# Patient Record
Sex: FEMALE | Race: WHITE | NOT HISPANIC OR LATINO | URBAN - METROPOLITAN AREA
[De-identification: names, ages, dates, MRNs, and addresses within clinical notes are randomized per-mention and may not be internally consistent; named-entity substitution may affect disease eponyms.]

---

## 2019-04-12 ENCOUNTER — OFFICE VISIT (OUTPATIENT)
Dept: POSTPARTUM | Facility: CLINIC | Age: 24
End: 2019-04-12
Payer: COMMERCIAL

## 2019-04-12 DIAGNOSIS — Z71.89 ENCOUNTER FOR BREAST FEEDING COUNSELING: Primary | ICD-10-CM

## 2019-04-12 PROCEDURE — 99404 PREV MED CNSL INDIV APPRX 60: CPT | Performed by: PEDIATRICS

## 2019-04-12 RX ORDER — PNV NO.95/FERROUS FUM/FOLIC AC 28MG-0.8MG
1 TABLET ORAL DAILY
COMMUNITY

## 2022-03-03 ENCOUNTER — OFFICE VISIT (OUTPATIENT)
Dept: FAMILY MEDICINE CLINIC | Facility: CLINIC | Age: 27
End: 2022-03-03
Payer: COMMERCIAL

## 2022-03-03 VITALS
DIASTOLIC BLOOD PRESSURE: 78 MMHG | RESPIRATION RATE: 18 BRPM | BODY MASS INDEX: 22.98 KG/M2 | TEMPERATURE: 98 F | SYSTOLIC BLOOD PRESSURE: 118 MMHG | WEIGHT: 134.6 LBS | OXYGEN SATURATION: 98 % | HEIGHT: 64 IN | HEART RATE: 99 BPM

## 2022-03-03 DIAGNOSIS — E55.9 VITAMIN D DEFICIENCY: ICD-10-CM

## 2022-03-03 DIAGNOSIS — R53.83 FATIGUE, UNSPECIFIED TYPE: ICD-10-CM

## 2022-03-03 DIAGNOSIS — Z00.00 ANNUAL PHYSICAL EXAM: Primary | ICD-10-CM

## 2022-03-03 DIAGNOSIS — Z13.6 SCREENING FOR CARDIOVASCULAR CONDITION: ICD-10-CM

## 2022-03-03 DIAGNOSIS — R45.86 MOOD SWING: ICD-10-CM

## 2022-03-03 DIAGNOSIS — Z11.59 NEED FOR HEPATITIS C SCREENING TEST: ICD-10-CM

## 2022-03-03 DIAGNOSIS — Z11.4 SCREENING FOR HIV (HUMAN IMMUNODEFICIENCY VIRUS): ICD-10-CM

## 2022-03-03 DIAGNOSIS — Z13.1 SCREENING FOR DIABETES MELLITUS: ICD-10-CM

## 2022-03-03 DIAGNOSIS — Z23 ENCOUNTER FOR IMMUNIZATION: ICD-10-CM

## 2022-03-03 DIAGNOSIS — Z12.4 SCREENING FOR CERVICAL CANCER: ICD-10-CM

## 2022-03-03 PROCEDURE — 90715 TDAP VACCINE 7 YRS/> IM: CPT | Performed by: FAMILY MEDICINE

## 2022-03-03 PROCEDURE — 99385 PREV VISIT NEW AGE 18-39: CPT | Performed by: FAMILY MEDICINE

## 2022-03-03 PROCEDURE — 1036F TOBACCO NON-USER: CPT | Performed by: FAMILY MEDICINE

## 2022-03-03 PROCEDURE — 3725F SCREEN DEPRESSION PERFORMED: CPT | Performed by: FAMILY MEDICINE

## 2022-03-03 PROCEDURE — 90471 IMMUNIZATION ADMIN: CPT | Performed by: FAMILY MEDICINE

## 2022-03-03 PROCEDURE — 3008F BODY MASS INDEX DOCD: CPT | Performed by: FAMILY MEDICINE

## 2022-03-03 NOTE — PROGRESS NOTES
[facility]    NAME: Israel Genao  AGE: 27 y o  SEX: female  : 1995     DATE: 3/3/2022     Assessment and Plan:     Problem List Items Addressed This Visit        Other    Vitamin D deficiency    Relevant Orders    Vitamin D 25 hydroxy    Mood swing    Relevant Orders    Lipid panel    Comprehensive metabolic panel    CBC and differential    TSH, 3rd generation with Free T4 reflex    UA (URINE) with reflex to Scope    Vitamin D 25 hydroxy    Fatigue    Relevant Orders    Lipid panel    Comprehensive metabolic panel    CBC and differential    TSH, 3rd generation with Free T4 reflex    UA (URINE) with reflex to Scope    Vitamin D 25 hydroxy      Other Visit Diagnoses     Annual physical exam    -  Primary    Relevant Orders    Lipid panel    Comprehensive metabolic panel    CBC and differential    TSH, 3rd generation with Free T4 reflex    UA (URINE) with reflex to Scope    Human Immunodeficiency Virus 1/2 Antigen / Antibody ( Fourth Generation) with Reflex Testing    Hepatitis C antibody    Vitamin D 25 hydroxy    Screening for cervical cancer        Relevant Orders    Ambulatory referral to Obstetrics / Gynecology    Screening for diabetes mellitus        Relevant Orders    Lipid panel    Comprehensive metabolic panel    Screening for cardiovascular condition        Relevant Orders    Lipid panel    Comprehensive metabolic panel    Screening for HIV (human immunodeficiency virus)        Relevant Orders    Human Immunodeficiency Virus 1/2 Antigen / Antibody ( Fourth Generation) with Reflex Testing    Need for hepatitis C screening test        Relevant Orders    Hepatitis C antibody    Encounter for immunization        Relevant Orders    TDAP VACCINE GREATER THAN OR EQUAL TO 6YO IM (Completed)          Immunizations and preventive care screenings were discussed with patient today   Appropriate education was printed on patient's after visit summary  Counseling:  Alcohol/drug use: discussed moderation in alcohol intake, the recommendations for healthy alcohol use, and avoidance of illicit drug use  Dental Health: discussed importance of regular tooth brushing, flossing, and dental visits  Injury prevention: discussed safety/seat belts, safety helmets, smoke detectors, carbon dioxide detectors, and smoking near bedding or upholstery  Sexual health: discussed sexually transmitted diseases, partner selection, use of condoms, avoidance of unintended pregnancy, and contraceptive alternatives  · Exercise: the importance of regular exercise/physical activity was discussed  Recommend exercise 3-5 times per week for at least 30 minutes  Return in about 1 year (around 3/3/2023) for Annual physical      Chief Complaint:     Chief Complaint   Patient presents with    Annual Exam      History of Present Illness:     Adult Annual Physical   Patient here for a comprehensive physical exam  Reports having more mood swings since having her kids  Especially worse since having kids  Diet and Physical Activity  · Diet/Nutrition: well balanced diet  · Exercise: moderate cardiovascular exercise  Depression Screening  PHQ-2/9 Depression Screening    Little interest or pleasure in doing things: 0 - not at all  Feeling down, depressed, or hopeless: 0 - not at all  PHQ-2 Score: 0  PHQ-2 Interpretation: Negative depression screen          /GYN Health  · Son 2019  · Son 2020  · 2      Review of Systems:     Review of Systems   Constitutional: Positive for fatigue  Negative for chills and fever  HENT: Negative for congestion, postnasal drip, rhinorrhea and sinus pressure  Eyes: Negative for photophobia and visual disturbance  Respiratory: Negative for cough and shortness of breath  Cardiovascular: Negative for chest pain, palpitations and leg swelling     Gastrointestinal: Negative for abdominal pain, constipation, diarrhea, nausea and vomiting  Genitourinary: Negative for difficulty urinating and dysuria  Musculoskeletal: Negative for arthralgias and myalgias  Skin: Negative for color change and rash  Neurological: Negative for dizziness, weakness, light-headedness and headaches  Psychiatric/Behavioral: Positive for behavioral problems  The patient is nervous/anxious  Past Medical History:     History reviewed  No pertinent past medical history     Past Surgical History:     Past Surgical History:   Procedure Laterality Date    WISDOM TOOTH EXTRACTION        Social History:     Social History     Socioeconomic History    Marital status: Unknown     Spouse name: None    Number of children: None    Years of education: None    Highest education level: None   Occupational History    None   Tobacco Use    Smoking status: Never Smoker    Smokeless tobacco: Never Used   Vaping Use    Vaping Use: Never used   Substance and Sexual Activity    Alcohol use: Not Currently    Drug use: Never    Sexual activity: Not Currently   Other Topics Concern    None   Social History Narrative    None     Social Determinants of Health     Financial Resource Strain: Not on file   Food Insecurity: Not on file   Transportation Needs: Not on file   Physical Activity: Not on file   Stress: Not on file   Social Connections: Not on file   Intimate Partner Violence: Not At Risk    Fear of Current or Ex-Partner: No    Emotionally Abused: No    Physically Abused: No    Sexually Abused: No   Housing Stability: Not on file      Family History:     Family History   Problem Relation Age of Onset    No Known Problems Mother     No Known Problems Father       Current Medications:     Current Outpatient Medications   Medication Sig Dispense Refill    Ferrous Sulfate (IRON) 325 (65 Fe) MG TABS Take 1 tablet by mouth daily (Patient not taking: Reported on 3/3/2022 )      Prenatal Vit-Fe Fumarate-FA (PRENATAL VITAMIN PO) Take 1 capsule by mouth daily (Patient not taking: Reported on 3/3/2022 )       No current facility-administered medications for this visit  Allergies:     No Known Allergies   Physical Exam:     /78 (BP Location: Left arm, Patient Position: Sitting, Cuff Size: Standard)   Pulse 99   Temp 98 °F (36 7 °C) (Tympanic)   Resp 18   Ht 5' 4 3" (1 633 m)   Wt 61 1 kg (134 lb 9 6 oz)   LMP 02/18/2022   SpO2 98%   BMI 22 89 kg/m²     Physical Exam  Constitutional:       General: She is not in acute distress  Appearance: Normal appearance  She is not ill-appearing, toxic-appearing or diaphoretic  HENT:      Head: Normocephalic and atraumatic  Right Ear: Tympanic membrane and ear canal normal       Left Ear: Tympanic membrane and ear canal normal       Nose: Nose normal  No congestion  Mouth/Throat:      Mouth: Mucous membranes are moist       Pharynx: Oropharynx is clear  No oropharyngeal exudate  Eyes:      Extraocular Movements: Extraocular movements intact  Conjunctiva/sclera: Conjunctivae normal    Cardiovascular:      Rate and Rhythm: Normal rate and regular rhythm  Pulses: Normal pulses  Heart sounds: No murmur heard  Pulmonary:      Effort: Pulmonary effort is normal       Breath sounds: Normal breath sounds  No wheezing, rhonchi or rales  Abdominal:      General: Bowel sounds are normal  There is no distension  Palpations: Abdomen is soft  Tenderness: There is no abdominal tenderness  Musculoskeletal:         General: No swelling or tenderness  Normal range of motion  Cervical back: Normal range of motion and neck supple  Skin:     General: Skin is warm and dry  Capillary Refill: Capillary refill takes less than 2 seconds  Neurological:      General: No focal deficit present  Mental Status: She is alert and oriented to person, place, and time  Cranial Nerves: No cranial nerve deficit     Psychiatric:         Mood and Affect: Mood normal          Behavior: Behavior normal          Thought Content:  Thought content normal           Meseret Wilkins DO   [facility]

## 2022-03-03 NOTE — PATIENT INSTRUCTIONS

## 2022-07-28 LAB
25(OH)D3 SERPL-MCNC: 56 NG/ML (ref 30–100)
ALBUMIN SERPL-MCNC: 4.4 G/DL (ref 3.6–5.1)
ALBUMIN/GLOB SERPL: 1.9 (CALC) (ref 1–2.5)
ALP SERPL-CCNC: 49 U/L (ref 31–125)
ALT SERPL-CCNC: 11 U/L (ref 6–29)
APPEARANCE UR: CLEAR
AST SERPL-CCNC: 13 U/L (ref 10–30)
BACTERIA UR QL AUTO: ABNORMAL /HPF
BASOPHILS # BLD AUTO: 22 CELLS/UL (ref 0–200)
BASOPHILS NFR BLD AUTO: 0.3 %
BILIRUB SERPL-MCNC: 0.6 MG/DL (ref 0.2–1.2)
BILIRUB UR QL STRIP: NEGATIVE
BUN SERPL-MCNC: 11 MG/DL (ref 7–25)
BUN/CREAT SERPL: NORMAL (CALC) (ref 6–22)
CALCIUM SERPL-MCNC: 9.1 MG/DL (ref 8.6–10.2)
CHLORIDE SERPL-SCNC: 101 MMOL/L (ref 98–110)
CHOLEST SERPL-MCNC: 125 MG/DL
CHOLEST/HDLC SERPL: 2.2 (CALC)
CO2 SERPL-SCNC: 26 MMOL/L (ref 20–32)
COLOR UR: YELLOW
CREAT SERPL-MCNC: 0.61 MG/DL (ref 0.5–0.96)
EOSINOPHIL # BLD AUTO: 94 CELLS/UL (ref 15–500)
EOSINOPHIL NFR BLD AUTO: 1.3 %
ERYTHROCYTE [DISTWIDTH] IN BLOOD BY AUTOMATED COUNT: 12.7 % (ref 11–15)
GFR/BSA.PRED SERPLBLD CYS-BASED-ARV: 124 ML/MIN/1.73M2
GLOBULIN SER CALC-MCNC: 2.3 G/DL (CALC) (ref 1.9–3.7)
GLUCOSE SERPL-MCNC: 87 MG/DL (ref 65–99)
GLUCOSE UR QL STRIP: NEGATIVE
HCT VFR BLD AUTO: 42.5 % (ref 35–45)
HCV AB S/CO SERPL IA: 0.18
HCV AB SERPL QL IA: NORMAL
HDLC SERPL-MCNC: 58 MG/DL
HGB BLD-MCNC: 13.7 G/DL (ref 11.7–15.5)
HGB UR QL STRIP: NEGATIVE
HIV 1+2 AB+HIV1 P24 AG SERPL QL IA: NORMAL
HYALINE CASTS #/AREA URNS LPF: ABNORMAL /LPF
KETONES UR QL STRIP: NEGATIVE
LDLC SERPL CALC-MCNC: 52 MG/DL (CALC)
LEUKOCYTE ESTERASE UR QL STRIP: ABNORMAL
LYMPHOCYTES # BLD AUTO: 2477 CELLS/UL (ref 850–3900)
LYMPHOCYTES NFR BLD AUTO: 34.4 %
MCH RBC QN AUTO: 30 PG (ref 27–33)
MCHC RBC AUTO-ENTMCNC: 32.2 G/DL (ref 32–36)
MCV RBC AUTO: 93 FL (ref 80–100)
MONOCYTES # BLD AUTO: 562 CELLS/UL (ref 200–950)
MONOCYTES NFR BLD AUTO: 7.8 %
NEUTROPHILS # BLD AUTO: 4046 CELLS/UL (ref 1500–7800)
NEUTROPHILS NFR BLD AUTO: 56.2 %
NITRITE UR QL STRIP: NEGATIVE
NONHDLC SERPL-MCNC: 67 MG/DL (CALC)
PH UR STRIP: 7.5 [PH] (ref 5–8)
PLATELET # BLD AUTO: 232 THOUSAND/UL (ref 140–400)
PMV BLD REES-ECKER: 10.3 FL (ref 7.5–12.5)
POTASSIUM SERPL-SCNC: 4.3 MMOL/L (ref 3.5–5.3)
PROT SERPL-MCNC: 6.7 G/DL (ref 6.1–8.1)
PROT UR QL STRIP: NEGATIVE
RBC # BLD AUTO: 4.57 MILLION/UL (ref 3.8–5.1)
RBC #/AREA URNS HPF: ABNORMAL /HPF
SODIUM SERPL-SCNC: 137 MMOL/L (ref 135–146)
SP GR UR STRIP: 1.01 (ref 1–1.03)
SQUAMOUS #/AREA URNS HPF: ABNORMAL /HPF
TRIGL SERPL-MCNC: 66 MG/DL
TSH SERPL-ACNC: 2.46 MIU/L
WBC # BLD AUTO: 7.2 THOUSAND/UL (ref 3.8–10.8)
WBC #/AREA URNS HPF: ABNORMAL /HPF

## 2022-10-12 ENCOUNTER — TELEPHONE (OUTPATIENT)
Dept: OBGYN CLINIC | Facility: CLINIC | Age: 27
End: 2022-10-12

## 2022-10-12 NOTE — TELEPHONE ENCOUNTER
Need prior delivery info 10/30/20 through midwife  Billy please look into it as patient scheduled for 10/19/22  Thanks

## 2022-10-12 NOTE — TELEPHONE ENCOUNTER
Lilly Lewis will reach out to [facility] facility, not sure how documentation was done  Aware to ask for office visit notes/outcome of delivery  I will continue monitoring reports

## 2022-10-17 NOTE — TELEPHONE ENCOUNTER
Still no report available, patient will still be seen for initial first prenatal intake on 10/19/22  Will close encounter

## 2022-10-19 ENCOUNTER — INITIAL PRENATAL (OUTPATIENT)
Dept: OBGYN CLINIC | Facility: CLINIC | Age: 27
End: 2022-10-19

## 2022-10-19 VITALS
SYSTOLIC BLOOD PRESSURE: 112 MMHG | WEIGHT: 139.4 LBS | BODY MASS INDEX: 23.22 KG/M2 | DIASTOLIC BLOOD PRESSURE: 72 MMHG | HEIGHT: 65 IN

## 2022-10-19 DIAGNOSIS — Z36.87 UNSURE OF LMP (LAST MENSTRUAL PERIOD) AS REASON FOR ULTRASOUND SCAN: ICD-10-CM

## 2022-10-19 DIAGNOSIS — O09.211 PREVIOUS PRETERM DELIVERY, ANTEPARTUM, FIRST TRIMESTER: Primary | ICD-10-CM

## 2022-10-19 DIAGNOSIS — Z3A.10 10 WEEKS GESTATION OF PREGNANCY: ICD-10-CM

## 2022-10-19 DIAGNOSIS — Z36.9 ENCOUNTER FOR ANTENATAL SCREENING, UNSPECIFIED: Primary | ICD-10-CM

## 2022-10-19 LAB
EXTERNAL CHLAMYDIA SCREEN: NEGATIVE
EXTERNAL GONORRHEA SCREEN: NEGATIVE
SL AMB  POCT GLUCOSE, UA: NEGATIVE
SL AMB POCT URINE PROTEIN: NEGATIVE

## 2022-10-19 NOTE — TELEPHONE ENCOUNTER
No report received, appointment is 10/19  If she was able to obtain it will be brought in at appointment

## 2022-10-19 NOTE — PATIENT INSTRUCTIONS
Pregnancy Diet   WHAT YOU NEED TO KNOW:   What is a healthy diet during pregnancy? A healthy diet during pregnancy is a meal plan that provides the amount of calories and nutrients you need during pregnancy  Your body needs extra calories and nutrients to support your growing baby  You need to gain the right amount of weight for a healthy baby and pregnancy  Babies born at a healthy weight have a lower risk of certain health problems at birth and later in life  A healthy diet may help you avoid gaining too much weight  Too much weight gain may cause problems for you during your pregnancy and delivery  What should I avoid or limit while I am pregnant? Do not drink alcohol during pregnancy  Alcohol can increase your risk of a miscarriage (losing your baby)  Your baby may also be born too small and have other health problems, such as learning problems later in life  Do not eat raw or undercooked foods  Examples include meat, poultry, eggs, fish, and shellfish (shrimp, crab, lobster)  Cook leftover foods and ready-to-eat foods such as hot dogs until they are steaming hot  Do not have anything that is not pasteurized  Pasteurization is a heating process that destroys bacteria  Do not have milk, juice, or cheese that has not been pasteurized  Cheeses include Brie, feta, Camembert, blue, and [place] cheeses  Limit caffeine to avoid possible health problems  It is not clear how caffeine affects pregnancy  Your dietitian can tell you how much caffeine is okay for you to have in a day or week  Caffeine may be found in coffee, tea, cola, sports drinks, and chocolate  Limit foods that contain mercury  Mercury is naturally found in almost all types of fish and shellfish  Some types of fish absorb higher levels of mercury that can be harmful to an unborn baby  Eat only fish and shellfish that are low in mercury  Each week, you may eat up to 12 ounces of fish or shellfish that have low levels of mercury   These include shrimp, canned light tuna, salmon, pollock, and catfish  Eat only 6 ounces of albacore (white) tuna per week  Albacore tuna has more mercury than canned tuna  Do not eat shark, swordfish, orquidea mackerel, or tilefish  Which foods can I eat while I am pregnant? Eat a variety of foods from each of the food groups listed below  Eat healthy foods even if you take a prenatal vitamin every day  Your dietitian will tell you how many servings you should have from each food group each day to get enough calories  The amount of calories you need depends on your daily activity, your weight before pregnancy, and current weight gain  In the first trimester, you usually do not need extra calories  In the second and third trimesters, most women should eat about 300 extra calories each day  Fruits and vegetables:  Half of your plate should contain fruits and vegetables  Fruits:  Choose fresh, canned, or dried fruit as often as possible  1 cup of sliced, diced, cooked, or canned fruit (canned in light syrup or 100% juice)    1 large peach, orange, or banana    ½ cup of dried fruit    1 cup of fruit juice    Vegetables:  Eat more dark green, red, and orange vegetables  Dark green vegetables include broccoli, spinach, goran lettuce, and mary greens  Examples of orange and red vegetables are carrots, sweet potatoes, winter squash, oranges, and red peppers  1 cup of cooked or raw vegetables    1 cup of vegetable juice    2 cups of raw leafy greens    Grains:  Half of the grains you eat each day should be whole grains      Whole grains:      ½ cup of cooked brown rice or cooked oatmeal    1 cup (1 ounce) of whole-grain dry cereal    1 slice of 731% whole-wheat or rye bread    3 cups of popped popcorn    Other grains:      ½ cup of cooked white rice or pasta    ½ of an English muffin    1 small flour or corn tortilla    1 mini-bagel    Dairy foods:  Choose fat-free or low-fat pasteurized dairy foods:    1½ ounces of hard cheese (mozzarella, Swiss, cheddar)    1 cup (8 ounces) of low-fat or fat-free milk or yogurt    1 cup of low-fat frozen yogurt or pudding    Meat and other protein sources:  Choose lean meats and poultry  Bake, broil, and grill meat instead of frying it  Include a variety of mercury-free seafood in place of some meat and poultry each week  Eat a variety of protein foods:    ½ ounce of nuts (12 almonds, 24 pistachios, 7 walnut halves) or 1 tablespoon of peanut butter (1 ounce)    ¼ cup of soy tofu or tempeh (1 ounce)    1 egg    ¼ cup of cooked dried beans, peas, or lentils (1 ounce)    1 small chicken breast or 1 small trout (about 3 ounces)    1 salmon steak (4 to 6 ounces)    1 small lean hamburger (2 to 3 ounces)    Fats:  Limit saturated fats, trans fats, and cholesterol  These unhealthy fats are found in shortening, butter, stick margarine, and animal fat  Choose healthy fats such as polyunsaturated and monounsaturated fats:    1 tablespoon of canola, olive, corn, sunflower, or soybean oil    1 tablespoon of soft margarine    1 teaspoon of mayonnaise    2 tablespoons of salad dressing    ½ of an avocado    What do I need to know about vitamin and mineral supplements? Your healthcare provider will tell you if you need a supplement and the type you should take  Talk to your provider before you take any other kind of supplement, including herbal (natural) supplements  The following are general guidelines:  Folic acid is one of the most important vitamins during pregnancy  Your prenatal vitamins will also contain folic acid  Make sure you take your prenatal vitamin every day  If you forget to take your vitamin, do not take double the amount the next day  You need at least 126 mcg of folic acid each day before you get pregnant  Folic acid helps to form your baby's brain and spinal cord in early pregnancy  During pregnancy, your daily need for folic acid increases to about 600 mcg   Get folic acid each day by eating citrus fruits and juices, green leafy vegetables, liver, or dried beans  Folic acid is also added to foods such as breakfast cereals, bread products, flour, and pasta  You need about 30 mg of iron each day during pregnancy  Iron is a mineral the body needs to make hemoglobin, which is a part of red blood cells  Hemoglobin helps your blood carry oxygen from the lungs to the rest of your body  Foods that are good sources of iron are meat, poultry, fish, beans, spinach, and fortified cereals and breads  Your body will absorb iron better from non-meat sources if you have a source of vitamin C at the same time  Drink tea and coffee separately from iron-fortified foods and iron supplements  Calcium and vitamin D needs go up during pregnancy  Women who do not eat dairy products may need a calcium and vitamin D supplement  Talk to your dietitian about calcium supplements if you do not regularly eat good sources of calcium  The amount of calcium you need is about 1,300 mg if you are between 15and 25years old and 1,000 mg if you are 23to 48years old  If you cannot drink milk or eat dairy foods, try lactose-free or lactose-reduced milk or calcium-fortified soy milk  Ask your dietitian about pills you can take to help you digest milk products  Eat other drinks and foods that are fortified with calcium, such as orange juice  What diet changes may help morning sickness? Morning sickness is common during the first few months of pregnancy  You may feel nauseated, and you may vomit several times each day  To improve symptoms of morning sickness, eat small meals often instead of 3 large meals  Foods high in carbohydrate, such as crackers, dry toast, and pasta, may be easier for you to eat  Drink liquids between meals rather than with meals  What diet changes may help constipation? A high-fiber diet can improve the symptoms of constipation   Whole-grain breakfast cereals, whole-grain breads, and prune juice are high in fiber  Raw fruits and vegetables, and cooked beans are also good sources of fiber  It may also be helpful to increase your intake of fluids and get regular physical activity  Talk with your healthcare provider before you begin any exercise program        What diet changes may help heartburn? To improve the symptoms of heartburn, do not lie down right after you eat  When you do lie down, sleep with your head slightly elevated  Eat small, frequent meals instead of 3 large meals  It may also be helpful to avoid caffeine, chocolate, and spicy foods  What other healthy guidelines should I follow? Make sure you get enough protein, vitamin B12, and iron if you are a vegetarian or vegan  Some non-meat sources of these nutrients are fortified cereals, nut butters, soy products (tofu and soymilk), nuts, grains, and legumes  These nutrients are also found in eggs and milk products  Talk to your dietitian about how to manage cravings for certain foods  Foods that are high in calories, fat, and sugar should not replace healthy food choices  Some women have cravings for unusual substances such as erwin, dirt, laundry starch, ice, and chalk  This condition is called pica  These may lead to health problems such as anemia and cause other health problems  When should I call my dietitian or obstetrician? You are losing weight without trying  You have cravings for substances such as erwin, dirt, laundry starch, or ice  You have questions or concerns about your condition or care  CARE AGREEMENT:   You have the right to help plan your care  Discuss treatment options with your healthcare provider to decide what care you want to receive  You always have the right to refuse treatment  The above information is an  only  It is not intended as medical advice for individual conditions or treatments   Talk to your doctor, nurse or pharmacist before following any medical regimen to see if it is safe and effective for you  © Copyright [employer] 2022 Information is for End User's use only and may not be sold, redistributed or otherwise used for commercial purposes  All illustrations and images included in CareNotes® are the copyrighted property of [employer]  or Robin Leung   Pregnancy   AMBULATORY CARE:   What you need to know about pregnancy:  A normal pregnancy lasts about 40 weeks  The first trimester lasts from your last period through the 12th week of pregnancy  The second trimester lasts from the 13th week through the 23rd week  The third trimester lasts from the 24th week until your baby is born  If you know the date of your last period, your healthcare provider can estimate your due date  You may give birth to your baby any time from 37 weeks to 2 weeks after your due date  Seek care immediately if:   You develop a severe headache that does not go away  You have new or increased vision changes, such as blurred or spotted vision  You have new or increased swelling in your face or hands  You have pain or cramping in your abdomen or low back  You have vaginal bleeding  Call your doctor or obstetrician if:   You have abdominal cramps, pressure, or tightening  You have a change in vaginal discharge  You cannot keep food or drinks down, and you are losing weight  You have chills or a fever  You have vaginal itching, burning, or pain  You have yellow, green, white, or foul-smelling vaginal discharge  You have pain or burning when you urinate, less urine than usual, or pink or bloody urine  You have questions or concerns about your condition or care  Prenatal care:  Prenatal care is a series of visits with your healthcare provider throughout your pregnancy  Prenatal care can help prevent problems during pregnancy and childbirth  At each prenatal visit, your provider will weigh you and check your blood pressure   He or she will also check your baby's heartbeat and growth  You may also need the following at some visits:  A pelvic exam  allows your healthcare provider to see your cervix (the bottom part of your uterus)  Your healthcare provider will use a speculum to open your vagina  He or she will check the size and shape of your uterus  At your first prenatal visit, you may also have a Pap smear  This is a test to check your cervix for abnormal cells  Blood tests  may be done to check for any of the following:     Gestational diabetes or anemia (low iron level)    Blood type or Rh factor, or certain birth defects    Immunity to certain diseases, such as chickenpox or rubella    An infection, such as a sexually transmitted infection, HIV, or hepatitis B    Hepatitis B  may need to be prevented or treated  Hepatitis B is inflammation of the liver caused by the hepatitis B virus (HBV)  HBV can spread from a mother to her baby during delivery  You will be checked for HBV as early as possible in the first trimester of each pregnancy  You need the test even if you received the hepatitis B vaccine or were tested before  You may need to have an HBV infection treated before you give birth  Urine tests  may also be done to check for sugar and protein  These can be signs of gestational diabetes or preeclampsia  Urine tests may also be done to check for signs of infection  A gestational diabetes screen  may be done  Your healthcare provider may order either a 1-step or 2-step oral glucose tolerance test (OGTT)  1-step OGTT:  Your blood sugar level will be tested after you have not eaten for 8 hours (fasting)  You will then be given a glucose drink  Your level will be tested again 1 hour and 2 hours after you finish the drink  2-step OGTT:  You do not have to fast for the first part of the test  You will have the glucose drink at any time of day  Your blood sugar level will be checked 1 hour later   If your blood sugar is higher than a certain level, another test will be ordered  You will fast and your blood sugar level will be tested  You will have the glucose drink  Your blood will be tested again 1 hour, 2 hours, and 3 hours after you finish the glucose drink  A fetal ultrasound  shows pictures of your baby inside your uterus  The pictures are used to check your baby's development, movement, and position  Genetic disorder screening tests  may be offered to you  These tests check your baby's risk for genetic disorders such as Down syndrome  A screening test may include blood tests and an ultrasound  Blood tests may be used to check your DNA or your partner's DNA  Genetic tests are not always accurate or complete  Your baby may be born with a genetic disorder that did not show up in the tests  Talk to your healthcare provider about any concerns you have with genetic testing  Body changes that may occur during your pregnancy:   Breast changes  you will experience include tenderness and tingling during the early part of your pregnancy  Your breasts will become larger  You may need to use a support bra  You may see a thin, yellow fluid, called colostrum, leak from your nipples during the second trimester  Colostrum is a liquid that changes to milk about 3 days after you give birth  Skin changes and stretch marks  may occur during your pregnancy  You may have red marks, called stretch marks, on your skin  Stretch marks will usually fade after pregnancy  Use lotion if your skin is dry and itchy  The skin on your face, around your nipples, and below your belly button may darken  Most of the time, your skin will return to its normal color after your baby is born  Morning sickness  is nausea and vomiting that can happen at any time of day  Avoid fatty and spicy foods  Eat small meals throughout the day instead of large meals  Lucretia may help to decrease nausea   Ask your healthcare provider about other ways of decreasing nausea and vomiting  Heartburn  may be caused by changes in your hormones during pregnancy  Your growing uterus may also push your stomach upward and force stomach acid to back up into your esophagus  Eat 4 or 5 small meals each day instead of large meals  Avoid spicy foods  Avoid eating right before bedtime  Constipation  may develop during your pregnancy  To treat constipation, eat foods high in fiber such as fiber cereals, beans, fruits, vegetables, whole-grain breads, and prune juice  Get regular exercise and drink plenty of water  Your healthcare provider may also suggest a fiber supplement to soften your bowel movements  Talk to your healthcare provider before you use any medicines to decrease constipation  Hemorrhoids  are enlarged veins in the rectal area  They may cause pain, itching, and bright red bleeding from your rectum  To decrease your risk for hemorrhoids, prevent constipation and do not strain to have a bowel movement  If you have hemorrhoids, soak in a tub of warm water to ease discomfort  Ask your healthcare provider how you can treat hemorrhoids  Leg cramps and swelling  may be caused by low calcium levels or the added weight of pregnancy  Raise your legs above the level of your heart to decrease swelling  During a leg cramp, stretch or massage the muscle that has the cramp  Heat may help decrease pain and muscle spasms  Apply heat on your muscle for 20 to 30 minutes every 2 hours for as many days as directed  Back pain  may occur as your baby grows  Do not stand for long periods of time or lift heavy items  Use good posture while you stand, squat, or bend  Wear low-heeled shoes with good support  Rest may also help to relieve back pain  Ask your healthcare provider about exercises you can do to strengthen your back muscles  Stay healthy during your pregnancy:       Eat a variety of healthy foods    Healthy foods include fruits, vegetables, whole-grain breads, low-fat dairy foods, beans, lean meats, and fish  Drink liquids as directed  Ask how much liquid to drink each day and which liquids are best for you  Limit caffeine to less than 200 milligrams each day  Limit your intake of fish to 2 servings each week  Choose fish low in mercury such as canned light tuna, shrimp, crab, salmon, cod, or tilapia  Do not  eat fish high in mercury such as swordfish, tilefish, orquidea mackerel, and shark  Take prenatal vitamins as directed  Your need for certain vitamins and minerals, such as folic acid, increases during pregnancy  Prenatal vitamins provide some of the extra vitamins and minerals you need  Prenatal vitamins may also help to decrease the risk for certain birth defects  Ask how much weight you should gain during your pregnancy  Too much or too little weight gain can be unhealthy for you and your baby  Talk to your healthcare provider about exercise  Moderate exercise can help you stay fit  Your healthcare provider will help you plan an exercise program that is safe for you during pregnancy  Do not smoke  Smoking increases your risk for a miscarriage and heart and blood vessel problems  Smoking can cause your baby to be born too early or weigh less at birth  Quit smoking as soon as you think you might be pregnant  Ask your healthcare provider for information if you need help quitting  Do not drink alcohol  Alcohol passes from your body to your baby through the placenta  It can affect your baby's brain development and cause fetal alcohol syndrome (FAS)  FAS is a group of conditions that causes mental, behavior, and growth problems  Talk to your healthcare provider before you take any medicines  Many medicines may harm your baby if you take them when you are pregnant  Do not take any medicines, vitamins, herbs, or supplements without first talking to your healthcare provider   Never use illegal or street drugs (such as marijuana or cocaine) while you are pregnant  Safety tips:   Avoid hot tubs and saunas  Do not use a hot tub or sauna while you are pregnant, especially during your first trimester  Hot tubs and saunas may raise your baby's temperature and increase the risk for birth defects  Avoid toxoplasmosis  This is an infection caused by eating raw meat or being around infected cat feces  It can cause birth defects, miscarriages, and other problems  Wash your hands after you touch raw meat  Make sure any meat is well-cooked before you eat it  Avoid raw eggs and unpasteurized milk  Use gloves or ask someone else to clean your cat's litter box while you are pregnant  Ask your healthcare provider about travel  The most comfortable time to travel is during the second trimester  Ask your healthcare provider if you can travel after 36 weeks  You may not be able to travel in an airplane after 36 weeks  He or she may also recommend that you avoid long road trips  Follow up with your doctor or obstetrician as directed:  Go to all of your prenatal visits during your pregnancy  Write down your questions so you remember to ask them during your visits  © Copyright [employer] 2022 Information is for End User's use only and may not be sold, redistributed or otherwise used for commercial purposes  All illustrations and images included in CareNotes® are the copyrighted property of [employer] Inc  or [facility] Santana Leung   The above information is an  only  It is not intended as medical advice for individual conditions or treatments  Talk to your doctor, nurse or pharmacist before following any medical regimen to see if it is safe and effective for you  Nausea and Vomiting in Pregnancy   WHAT YOU NEED TO KNOW:   Nausea and vomiting can happen any time of day  These symptoms usually start before the 9th week of pregnancy, and end by the 14th week (second trimester)  Some women can have nausea and vomiting for a longer time   These symptoms can affect some women throughout the entire pregnancy  Nausea and vomiting do not harm your baby  These symptoms can make it hard for you to do your daily activities  DISCHARGE INSTRUCTIONS:   Return to the emergency department if:   You have signs of dehydration  Examples are dark yellow urine, dry mouth and lips, dry skin, fast heartbeat, and urinating less than usual     You have severe abdominal pain  You feel too weak or dizzy to stand up  You see blood in your vomit or bowel movements  Call your doctor if:   You vomit more than 4 times in 1 day  You have not been able to keep liquids down for more than 1 day  You lose more than 2 pounds  You have a fever  Your nausea and vomiting continue longer than 14 weeks  You have questions or concerns about your condition or care  Nutrition changes you can make to manage nausea and vomiting:   Eat small meals throughout the day instead of 3 large meals  You may be more likely to have nausea and vomiting when your stomach is empty  Eat foods that are low in fat and high in protein  Examples are lean meat, beans, turkey, and chicken without the skin  Eat a small snack, such as crackers, dry cereal, or a small sandwich before you go to bed  Eat some crackers or dry toast before you get out of bed in the morning  Get out of bed slowly  Sudden movements could cause you to get dizzy and nauseated  Eat bland foods when you feel nauseated  Examples of bland foods include dry toast, dry cereal, plain pasta, white rice, and bread  Other bland foods include saltine crackers, bananas, gelatin, and pretzels  Avoid spicy, greasy, and fried foods  Avoid any other foods that make you feel nauseated  Drink liquids that contain ginger  Drink ginger ale made with real ginger or ginger tea made with fresh grated ginger  Ginger capsules or ginger candies may also help to decrease nausea and vomiting  Drink liquids between meals instead of with meals  Wait at least 30 minutes after you eat to drink liquids  Drink small amounts of liquids often throughout the day to prevent dehydration  Ask how much liquid you should drink each day  Other changes you can make to manage nausea and vomiting:   Avoid smells that bother you  Strong odors may cause nausea and vomiting to start, or make it worse  Take a short walk, turn on a fan, or try to sleep with the window open to get fresh air  When you are cooking, open windows to get rid of smells that may cause nausea  Do not brush your teeth right after you eat  if it makes you nauseated  Rest when you need to  Start activity slowly and work up to your usual routine as you start to feel better  Talk to your healthcare provider about your prenatal vitamins  Prenatal vitamins can cause nausea for some women  Try taking your prenatal vitamin at night or with a snack  If this change does not help, your healthcare provider may recommend a different type of vitamin  Do not use any medicines, vitamins, or supplements to manage your symptoms without asking your healthcare provider  Many medicines can harm an unborn baby  Light to moderate exercise  may help to decrease your symptoms  It may also help you to sleep better at night  Ask your healthcare provider about the best exercise plan for you  Follow up with your doctor as directed:  Write down your questions so you remember to ask them during your visits  © Copyright [employer] 2022 Information is for End User's use only and may not be sold, redistributed or otherwise used for commercial purposes  All illustrations and images included in CareNotes® are the copyrighted property of A D A M , Inc  or Robin Morales  The above information is an  only  It is not intended as medical advice for individual conditions or treatments   Talk to your doctor, nurse or pharmacist before following any medical regimen to see if it is safe and effective for you

## 2022-10-19 NOTE — PROGRESS NOTES
OB INTAKE INTERVIEW  Patient is 32 y  o who presents for OB intake at wks  She is accompanied by: by herself  The father of her baby Sam Quinones is involved in the pregnancy and is 39years old      Last Menstrual Period: 2022   Ultrasound: Measured 11 weeks 1 days on 10/19/2022  Estimated Date of Delivery: 2023 confirmed by  10 6 week US (Nurse will not know this if provider sees pt before OB appt)  Signs/Symptoms of Pregnancy  Current pregnancy symptoms: nausea  Constipation no  Headaches no  Cramping/spotting no  PICA cravings no    Diabetes-  There is no height or weight on file to calculate BMI  If patient has 1 or more, please order early 1 hour GTT  History of GDM no  BMI >30 no  History of PCOS or current metformin use no  History of LGA/macrosomic infant (4000g/9lbs) no    If patient has 2 or more, please order early 1 hour GTT  AMA no  First degree relative with type 2 diabetes no  History of chronic HTN, hyperlipidemia, elevated A1C no  High risk race (, , ,  or ) no    Hypertension- if you answer yes, please order preeclampsia labs (cbc, comprehensive metabolic panel, urine protein creatinine ratio, uric acid)  History of of chronic HTN no  History of gestational HTN no  History of preeclampsia, eclampsia, or HELLP syndrome no  History of diabetes no  History of lupus, autoimmune disease, kidney disease, antiphospholipid syndrome, rheumatoid arthritis, sjogren's syndrome no    Thyroid- if yes order TSH with reflex T4 (Unless TSH value on file within last 4 weeks then do not need to order)  History of thyroid disease no    Bleeding Disorder or Hx of DVT-patient or first degree relative with history of  Order the following if not done previously     (Factor V, antithrombin III, prothrombin gene mutation, protein C and S Ag, lupus anticoagulant, anticardiolipin, beta-2 glycoprotein)   no    OB/GYN-  History of abnormal pap smear no  History of HPV no  History of Herpes/HSV no  History of other STI (gonorrhea, chlamydia, trich) (or current partner with Hep B, Hep C, or HIV) no  History of prior  no  History of prior  no  History of  delivery prior to 36 weeks 6 days YES  History of blood transfusion no  Ok for blood transfusion YES    Substance screening-   History of tobacco use no  Currently using tobacco no  Currently using alcohol no  Presently using drugs no  Past drug use (if yes, order urine drug screening panel)  no  IV drug use (if yes, order urine drug screening panel) no  Partner drug use (if yes, order urine drug screening panel) no  Parent/Family drug use (do not order urine drug screening panel just for family hx) no    Depression Screening-  PHQ-9 Score: (2)    MRSA Screening-   Does the pt have a hx of MRSA? no  If yes- please follow MRSA protocol and obtain a nasal swab for MRSA culture at 12 week visit  Immunizations:  Influenza vaccine given this season -NO-declined at today's visit  Discussed Tdap vaccine (ask date) YES  Discussed COVID Vaccine -YES, currently declineds    Genetic/MFM-  Do you or your partner have a history of any of the following in yourselves or first degree relatives? Cystic fibrosis no  Spinal muscular atrophy no  Hemoglobinopathy/Sickle Cell/Thalassemia no  Fragile X Intellectual Disability no    If yes, discuss carrier screening and recommend consultation with MFM/genetic counseling  If no, discuss option for carrier screening and/or genetic testing with Nuchal Ultrasound  Patient interested -YES      Interview education  [facility] Pregnancy Essentials Book reviewed and discussed YES  Reviewed use of Vitamin B6 and unisom for nausea        Prenatal lab work scripts-routine prenatal lab orders provided    Extra labs ordered:  none     ONAF form completed and submitted on [employer]  care    Details that I feel the provider should be aware of: New patient to [facility]  The patient has a history now or in prior pregnancy notable for:  delivery-failure to progress in first pregnancy  Precipitous labor in 2nd pregnancy-delivered at home in 30min  The patient was oriented to our practice, reviewed delivering physicians and Ondina Orantes in [facility]  All questions were answered      Interviewed by: Sosa Krishnamurthy LPN

## 2022-10-21 LAB
C TRACH RRNA SPEC QL NAA+PROBE: NEGATIVE
N GONORRHOEA RRNA SPEC QL NAA+PROBE: NEGATIVE

## 2022-10-31 ENCOUNTER — ROUTINE PRENATAL (OUTPATIENT)
Dept: PERINATAL CARE | Facility: OTHER | Age: 27
End: 2022-10-31

## 2022-10-31 VITALS
BODY MASS INDEX: 24.06 KG/M2 | HEIGHT: 65 IN | DIASTOLIC BLOOD PRESSURE: 68 MMHG | HEART RATE: 85 BPM | SYSTOLIC BLOOD PRESSURE: 102 MMHG | WEIGHT: 144.4 LBS

## 2022-10-31 DIAGNOSIS — Z36.82 NUCHAL TRANSLUCENCY OF FETUS ON PRENATAL ULTRASOUND: ICD-10-CM

## 2022-10-31 DIAGNOSIS — Z3A.12 12 WEEKS GESTATION OF PREGNANCY: ICD-10-CM

## 2022-10-31 DIAGNOSIS — O09.211 PREVIOUS PRETERM DELIVERY, ANTEPARTUM, FIRST TRIMESTER: Primary | ICD-10-CM

## 2022-10-31 DIAGNOSIS — Z36.9 ENCOUNTER FOR ANTENATAL SCREENING, UNSPECIFIED: ICD-10-CM

## 2022-10-31 RX ORDER — OMEGA-3S/DHA/EPA/FISH OIL/D3 300MG-1000
400 CAPSULE ORAL DAILY
COMMUNITY

## 2022-10-31 NOTE — LETTER
2022     Chon Preciado MD  [facility] [address]  [address] [facility] [address] 4  [facility]    Patient: Susie Coombs   YOB: 1995   Date of Visit: 10/31/2022       Dear Dr Chip Vee: Thank you for referring Susie Coombs to me for evaluation  Below are my notes for this consultation  If you have questions, please do not hesitate to call me  I look forward to following your patient along with you  Sincerely,        Sahil Be MD        CC: No Recipients  Sahil Be MD  2022  9:23 PM  Sign when Signing Visit  OFFICE CONSULT  Referring physician:   [address]  [facility],  [address] [facility]      Dear Dr Chip Vee      Thank you for requesting a  consultation on your patient Ms Susie Coombs for the following indications:  Genetic screening    History  Simi Razo is on prenatal vitamins, iron and vitamin-D  She reports no allergies  She denies any substance use  She denies any significant medical history  Surgical history includes wisdom tooth extraction  Family history includes pancreatic cancer in her father who has passed  OB history includes baby at 36 weeks and 3 days who delivered vaginally weighing 5 pounds 15 ounces on 3/6/19  She was induced secondary to ruptured membranes  She reports her son required a 2 week NICU stay  Then in 10/30/20 at 38 weeks and 5 days she had a 7 pound 1 ounce baby girl born vaginally  In that pregnancy she reports no need for cervical length screening or vaginal progesterone  Ultrasound findings: The ultrasound shows a fetus concordant with dates  The nasal bone and nuchal translucency appears normal  No malformations are seen on today's early ultrasound  The patient was informed of the findings and counseled about the limitations of the exam in detecting all forms of fetal congenital abnormalities  She does not report any vaginal bleeding or uterine cramping or contractions        Specific counseling was provided on the following problems:  1  Her risk for having a baby with a chromosome problem at her age at birth is 1 in 56  We discussed the options for genetic screening which include invasive testing on the fetal placenta or on fetal skin cells within the amniotic fluid and compared this to noninvasive testing which includes cell free DNA screening and the sequential screen  We reviewed the risks, the benefits and the limitations of each  In the end patient declined any genetic screening  2  History of a 36 week 3 day prior  delivery after induction for P prom  We discussed that she may be at increased risk for having another  delivery  The recurrence risk is difficult to quantify accurately  It may be 15-20 percent  Options to decrease this risk may be cervical length screening between 16-23 weeks and 6 days every 2 weeks looking for signs of a short cervix (less than 2 5 centimeters)  If a short cervix is found she may be a candidate for a cerclage placement  Other option includes placing 200 mgs of vaginal progesterone in the vagina every night starting at 16 weeks  This would continue till she is 36 weeks and 6 days  She was agreeable to scheduling her transvaginal scans but is unsure at this time if she would like to go on vaginal progesterone and she will let her OB providers or MFM know at her next visit  3  She has not had a COVID vaccine yet  She states her  had the COVID infection but she did not develop symptoms  The CDC recommends the covid vaccine be given in pregnancy in all trimesters  4  Recommend she have the flu shot through her OB office  Future tests recommended:  1  Recommend her OB office offer screening for MSAFP at 16-18 weeks to screen for spina bifida  Future ultrasounds ordered today:   1  Fetal Level II ultrasound imaging is recommended at 19-20 weeks' gestation    2  I ordered cervical length screening to be completed at 16, 18, 20 and 22 weeks  The face to face time, in addition to time spent discussing ultrasound results, was approximately 15 minutes, greater than 50% of which was spent during counseling and coordination of care      Humera Mistry

## 2022-10-31 NOTE — PROGRESS NOTES
OFFICE CONSULT  Referring physician:   [address] [place] [address] [place] [address],  [address] [facility]      Dear Dr Flavio Weber      Thank you for requesting a  consultation on your patient Ms Kareen Siegel for the following indications:  Genetic screening    History  Edrie Melissa is on prenatal vitamins, iron and vitamin-D  She reports no allergies  She denies any substance use  She denies any significant medical history  Surgical history includes wisdom tooth extraction  Family history includes pancreatic cancer in her father who has passed  OB history includes baby at 36 weeks and 3 days who delivered vaginally weighing 5 pounds 15 ounces on 3/6/19  She was induced secondary to ruptured membranes  She reports her son required a 2 week NICU stay  Then in 10/30/20 at 38 weeks and 5 days she had a 7 pound 1 ounce baby girl born vaginally  In that pregnancy she reports no need for cervical length screening or vaginal progesterone  Ultrasound findings: The ultrasound shows a fetus concordant with dates  The nasal bone and nuchal translucency appears normal  No malformations are seen on today's early ultrasound  The patient was informed of the findings and counseled about the limitations of the exam in detecting all forms of fetal congenital abnormalities  She does not report any vaginal bleeding or uterine cramping or contractions  Specific counseling was provided on the following problems:  1  Her risk for having a baby with a chromosome problem at her age at birth is 1 in 56  We discussed the options for genetic screening which include invasive testing on the fetal placenta or on fetal skin cells within the amniotic fluid and compared this to noninvasive testing which includes cell free DNA screening and the sequential screen  We reviewed the risks, the benefits and the limitations of each  In the end patient declined any genetic screening       2  History of a 36 week 3 day prior  delivery after induction for P prom  We discussed that she may be at increased risk for having another  delivery  The recurrence risk is difficult to quantify accurately  It may be 15-20 percent  Options to decrease this risk may be cervical length screening between 16-23 weeks and 6 days every 2 weeks looking for signs of a short cervix (less than 2 5 centimeters)  If a short cervix is found she may be a candidate for a cerclage placement  Other option includes placing 200 mgs of vaginal progesterone in the vagina every night starting at 16 weeks  This would continue till she is 36 weeks and 6 days  She was agreeable to scheduling her transvaginal scans but is unsure at this time if she would like to go on vaginal progesterone and she will let her OB providers or MFM know at her next visit  3  She has not had a COVID vaccine yet  She states her  had the COVID infection but she did not develop symptoms  The CDC recommends the covid vaccine be given in pregnancy in all trimesters  4  Recommend she have the flu shot through her OB office  Future tests recommended:  1  Recommend her OB office offer screening for MSAFP at 16-18 weeks to screen for spina bifida  Future ultrasounds ordered today:   1  Fetal Level II ultrasound imaging is recommended at 19-20 weeks' gestation  2  I ordered cervical length screening to be completed at 16, 18, 20 and 22 weeks  The face to face time, in addition to time spent discussing ultrasound results, was approximately 15 minutes, greater than 50% of which was spent during counseling and coordination of care      Walker Ceballos

## 2022-11-01 PROBLEM — Z3A.12 12 WEEKS GESTATION OF PREGNANCY: Status: ACTIVE | Noted: 2022-11-01

## 2022-11-11 LAB
EXTERNAL ABO GROUPING: NORMAL
EXTERNAL ANTIBODY SCREEN: NORMAL
EXTERNAL HEMATOCRIT: 39.7 %
EXTERNAL HEMOGLOBIN: 13.4 G/DL
EXTERNAL HEPATITIS B SURFACE ANTIGEN: NEGATIVE
EXTERNAL HIV-1/2 AB-AG: NORMAL
EXTERNAL PLATELET COUNT: 225 K/ÂΜL
EXTERNAL RH FACTOR: POSITIVE
EXTERNAL RUBELLA IGG QUANTITATION: NORMAL
EXTERNAL SYPHILIS RPR SCREEN: NORMAL

## 2022-11-12 LAB
ABO GROUP BLD: ABNORMAL
APPEARANCE UR: CLEAR
BACTERIA UR CULT: NORMAL
BACTERIA URNS QL MICRO: ABNORMAL
BASOPHILS # BLD AUTO: 0 X10E3/UL (ref 0–0.2)
BASOPHILS NFR BLD AUTO: 0 %
BILIRUB UR QL STRIP: NEGATIVE
BLD GP AB SCN SERPL QL: NEGATIVE
CASTS URNS QL MICRO: ABNORMAL /LPF
COLOR UR: YELLOW
EOSINOPHIL # BLD AUTO: 0.1 X10E3/UL (ref 0–0.4)
EOSINOPHIL NFR BLD AUTO: 1 %
EPI CELLS #/AREA URNS HPF: ABNORMAL /HPF (ref 0–10)
ERYTHROCYTE [DISTWIDTH] IN BLOOD BY AUTOMATED COUNT: 12.6 % (ref 11.7–15.4)
GLUCOSE UR QL: NEGATIVE
HBV SURFACE AG SERPL QL IA: NEGATIVE
HCT VFR BLD AUTO: 39.7 % (ref 34–46.6)
HCV AB S/CO SERPL IA: <0.1 S/CO RATIO (ref 0–0.9)
HGB BLD-MCNC: 13.4 G/DL (ref 11.1–15.9)
HGB UR QL STRIP: NEGATIVE
HIV 1+2 AB+HIV1 P24 AG SERPL QL IA: NON REACTIVE
IMM GRANULOCYTES # BLD: 0 X10E3/UL (ref 0–0.1)
IMM GRANULOCYTES NFR BLD: 0 %
KETONES UR QL STRIP: NEGATIVE
LEUKOCYTE ESTERASE UR QL STRIP: ABNORMAL
LYMPHOCYTES # BLD AUTO: 1.8 X10E3/UL (ref 0.7–3.1)
LYMPHOCYTES NFR BLD AUTO: 18 %
Lab: NORMAL
MCH RBC QN AUTO: 30.7 PG (ref 26.6–33)
MCHC RBC AUTO-ENTMCNC: 33.8 G/DL (ref 31.5–35.7)
MCV RBC AUTO: 91 FL (ref 79–97)
MICRO URNS: ABNORMAL
MONOCYTES # BLD AUTO: 0.4 X10E3/UL (ref 0.1–0.9)
MONOCYTES NFR BLD AUTO: 4 %
NEUTROPHILS # BLD AUTO: 7.6 X10E3/UL (ref 1.4–7)
NEUTROPHILS NFR BLD AUTO: 77 %
NITRITE UR QL STRIP: NEGATIVE
PH UR STRIP: 7 [PH] (ref 5–7.5)
PLATELET # BLD AUTO: 225 X10E3/UL (ref 150–450)
PROT UR QL STRIP: NEGATIVE
RBC # BLD AUTO: 4.36 X10E6/UL (ref 3.77–5.28)
RBC #/AREA URNS HPF: ABNORMAL /HPF (ref 0–2)
RH BLD: POSITIVE
RPR SER QL: NON REACTIVE
RUBV IGG SERPL IA-ACNC: <0.9 INDEX
SP GR UR: 1.01 (ref 1–1.03)
UROBILINOGEN UR STRIP-ACNC: 0.2 MG/DL (ref 0.2–1)
WBC # BLD AUTO: 9.9 X10E3/UL (ref 3.4–10.8)
WBC #/AREA URNS HPF: ABNORMAL /HPF (ref 0–5)

## 2022-11-17 ENCOUNTER — ROUTINE PRENATAL (OUTPATIENT)
Dept: OBGYN CLINIC | Facility: CLINIC | Age: 27
End: 2022-11-17

## 2022-11-17 VITALS
WEIGHT: 148.8 LBS | BODY MASS INDEX: 24.79 KG/M2 | SYSTOLIC BLOOD PRESSURE: 112 MMHG | DIASTOLIC BLOOD PRESSURE: 74 MMHG | HEIGHT: 65 IN

## 2022-11-17 DIAGNOSIS — Z3A.15 15 WEEKS GESTATION OF PREGNANCY: Primary | ICD-10-CM

## 2022-11-17 LAB
SL AMB  POCT GLUCOSE, UA: NORMAL
SL AMB POCT URINE PROTEIN: NORMAL

## 2022-11-17 NOTE — ASSESSMENT & PLAN NOTE
Reviewed prenatal lab results  Aware non immune to Sri Vincent consider booster  Offered AFP to evaluate for ONTD such as spina bifida, patient declines  Has follow up with MFM at 16 weeks for cervical length checks due to history of  labor  Return to office for ob check in 4 weeks

## 2022-11-17 NOTE — PROGRESS NOTES
Routine Prenatal Visit  [address] [facility], [address], [facility], [address] [facility] [address]    Assessment/Plan:  Puja Trujillo is a 32y o  year old  at 15w0d who presents for routine prenatal visit  1  15 weeks gestation of pregnancy  Assessment & Plan:  Reviewed prenatal lab results  Aware non immune to Sri Vincent consider booster  Offered AFP to evaluate for ONTD such as spina bifida, patient declines  Has follow up with MFM at 16 weeks for cervical length checks due to history of  labor  Return to office for ob check in 4 weeks  Orders:  -     POCT urine dip      Next OB Visit 4 weeks  Subjective:     CC: Prenatal care    Cisco Harris is a 32 y o  [id number] female who presents for routine prenatal care at 15w0d  Pregnancy ROS: nausea improved  Maybe feeling flutters  No HA, cramping, VB, LOF, edema, domestic violence, or smoking  Tolerating PNV          The following portions of the patient's history were reviewed and updated as appropriate: allergies, current medications, past family history, past medical history, obstetric history, gynecologic history, past social history, past surgical history and problem list       Objective:  /74 (BP Location: Right arm, Patient Position: Sitting, Cuff Size: Standard)   Ht 5' 4 75" (1 645 m)   Wt 67 5 kg (148 lb 12 8 oz)   LMP 2022 (Exact Date)   BMI 24 95 kg/m²   Pregravid Weight/BMI: 60 8 kg (134 lb) (BMI 22 46)  Current Weight: 67 5 kg (148 lb 12 8 oz)   Total Weight Gain: 6 713 kg (14 lb 12 8 oz)   Pre-Leonard Vitals    Flowsheet Row Most Recent Value   Prenatal Assessment    Fetal Heart Rate 155   Fundal Height (cm) 15 cm   Movement Absent   Prenatal Vitals    Blood Pressure 112/74   Weight - Scale 67 5 kg (148 lb 12 8 oz)   Urine Albumin/Glucose    Dilation/Effacement/Station    Vaginal Drainage    Edema    LLE Edema None   RLE Edema None   Facial Edema None           General: Well appearing, no distress  Abdomen: Soft, gravid, nontender  Fundal Height: Appropriate for gestational age  Extremities: Warm and well perfused  Non tender

## 2022-11-29 PROBLEM — Z3A.16 16 WEEKS GESTATION OF PREGNANCY: Status: ACTIVE | Noted: 2022-11-01

## 2022-11-29 NOTE — PATIENT INSTRUCTIONS
Thank you for choosing us for your  care today  If you have any questions about your ultrasound or care, please do not hesitate to contact us or your primary obstetrician  Some general instructions for your pregnancy are:    Exercise: Aim for 22 minutes per day (150 minutes per week) of regular exercise  Walking is great! Nutrition: Choose healthy sources of calcium, iron, and protein  Learn about Preeclampsia: preeclampsia is a common, serious high blood pressure complication in pregnancy  A blood pressure of 630PSSZ (systolic or top number) or 10DYVN (diastolic or bottom number) is not normal and needs evaluation by your doctor  Aspirin is sometimes prescribed in early pregnancy to prevent preeclampsia in women with risk factors - ask your obstetrician if you should be on this medication  If you smoke, try to reduce how many cigarettes you smoke or try to quit completely  Do not vape  Other warning signs to watch out for in pregnancy or postpartum: chest pain, obstructed breathing or shortness of breath, seizures, thoughts of hurting yourself or your baby, bleeding, a painful or swollen leg, fever, or headache (see AWHONN POST-BIRTH Warning Signs campaign)  If these happen call 911  Itching is also not normal in pregnancy and if you experience this, especially over your hands and feet, potentially worse at night, notify your doctors

## 2022-11-30 ENCOUNTER — ROUTINE PRENATAL (OUTPATIENT)
Dept: PERINATAL CARE | Facility: OTHER | Age: 27
End: 2022-11-30

## 2022-11-30 VITALS
HEIGHT: 65 IN | SYSTOLIC BLOOD PRESSURE: 116 MMHG | BODY MASS INDEX: 25.49 KG/M2 | HEART RATE: 84 BPM | DIASTOLIC BLOOD PRESSURE: 60 MMHG | WEIGHT: 153 LBS

## 2022-11-30 DIAGNOSIS — Z3A.16 16 WEEKS GESTATION OF PREGNANCY: ICD-10-CM

## 2022-11-30 DIAGNOSIS — Z36.86 ENCOUNTER FOR ANTENATAL SCREENING FOR CERVICAL LENGTH: ICD-10-CM

## 2022-11-30 DIAGNOSIS — O09.211 PREVIOUS PRETERM DELIVERY, ANTEPARTUM, FIRST TRIMESTER: Primary | ICD-10-CM

## 2022-11-30 PROBLEM — O44.40 LOW-LYING PLACENTA: Status: ACTIVE | Noted: 2022-11-30

## 2022-11-30 NOTE — LETTER
November 30, 2022     Gordy Perez, [facility]  [address] [place] [address],[address]  [place] [address]    Patient: Ibrahima Uriarte   YOB: 1995   Date of Visit: 11/30/2022       Dear Gisselle Alcazar: Thank you for referring Ibrahima Uriarte to me for evaluation  Below are my notes for this consultation  If you have questions, please do not hesitate to call me  I look forward to following your patient along with you  Sincerely,        Nick Olivera MD        CC: No Recipients  Nick Olivera MD  11/30/2022 10:30 AM  Sign when Signing Visit  [id number] [facility]: Ms Sujata García was seen today at [place] [address] for serial cervical length screening ultrasound  See ultrasound report under "OB Procedures" tab    Please don't hesitate to contact our office with any concerns or questions   -Nick Olivera MD

## 2022-11-30 NOTE — PROGRESS NOTES
[id number] [facility]: Ms Bhavya Foley was seen today at [place] [address] for serial cervical length screening ultrasound  See ultrasound report under "OB Procedures" tab    Please don't hesitate to contact our office with any concerns or questions   -Aj Sanon MD

## 2022-11-30 NOTE — PROGRESS NOTES
Ultrasound Probe Disinfection    A transvaginal ultrasound was performed  Prior to use, disinfection was performed with High Level Disinfection Process (Trophon)  Probe serial number U2: [id number] was used        Simi Gutierrez  11/30/22  9:47 AM

## 2022-12-11 NOTE — PROGRESS NOTES
Please refer to the [facility] ultrasound report in Ob Procedures for additional information regarding today's visit

## 2022-12-13 ENCOUNTER — ROUTINE PRENATAL (OUTPATIENT)
Dept: OBGYN CLINIC | Facility: CLINIC | Age: 27
End: 2022-12-13

## 2022-12-13 VITALS
HEIGHT: 65 IN | DIASTOLIC BLOOD PRESSURE: 70 MMHG | WEIGHT: 157 LBS | SYSTOLIC BLOOD PRESSURE: 110 MMHG | BODY MASS INDEX: 26.16 KG/M2

## 2022-12-13 DIAGNOSIS — O09.212 SUPERVISION OF PREGNANCY WITH HISTORY OF PRE-TERM LABOR IN SECOND TRIMESTER: ICD-10-CM

## 2022-12-13 DIAGNOSIS — Z3A.18 18 WEEKS GESTATION OF PREGNANCY: Primary | ICD-10-CM

## 2022-12-13 DIAGNOSIS — O44.40 LOW-LYING PLACENTA: ICD-10-CM

## 2022-12-13 LAB
SL AMB  POCT GLUCOSE, UA: NORMAL
SL AMB POCT URINE PROTEIN: NORMAL

## 2022-12-13 NOTE — PROGRESS NOTES
Routine Prenatal Visit  [address] [facility], [address], [facility], [address] [facility]    Assessment/Plan:  Sofia Ibrahim is a 32y o  year old  at 18w5d who presents for routine prenatal visit  1  18 weeks gestation of pregnancy  -     POCT urine dip    2  Previous  delivery, antepartum, first trimester    3  Low-lying placenta    + fm  No bleeding, cramping, chg in  DC  Labs reviewed  Declines  Genetic screening    + every 2 week  Cervical length  Wt gain reviewed  Subjective:     CC: Prenatal care    Chip Morel is a 32 y o  [id number] female who presents for routine prenatal care at 18w5d  Pregnancy ROS: no   leakage of fluid, pelvic pain, or vaginal bleeding   +  fetal movement  The following portions of the patient's history were reviewed and updated as appropriate: allergies, current medications, past family history, past medical history, obstetric history, gynecologic history, past social history, past surgical history and problem list       Objective:  /70 (BP Location: Left arm, Patient Position: Sitting, Cuff Size: Standard)   Ht 5' 4 75" (1 645 m)   Wt 71 2 kg (157 lb)   LMP 2022 (Exact Date)   BMI 26 33 kg/m²   Pregravid Weight/BMI: 60 8 kg (134 lb) (BMI 22 46)  Current Weight: 71 2 kg (157 lb)   Total Weight Gain: 10 4 kg (23 lb)   Pre- Vitals    Flowsheet Row Most Recent Value   Prenatal Assessment    Prenatal Vitals    Blood Pressure 110/70   Weight - Scale 71 2 kg (157 lb)   Urine Albumin/Glucose    Dilation/Effacement/Station    Vaginal Drainage    Edema            General: Well appearing, no distress  Respiratory: Unlabored breathing  Cardiovascular: Regular rate  Abdomen: Soft, gravid, nontender  Fundal Height: Appropriate for gestational age  Extremities: Warm and well perfused  Non tender

## 2022-12-14 ENCOUNTER — ROUTINE PRENATAL (OUTPATIENT)
Dept: PERINATAL CARE | Facility: OTHER | Age: 27
End: 2022-12-14

## 2022-12-14 VITALS
HEART RATE: 93 BPM | HEIGHT: 65 IN | WEIGHT: 160.2 LBS | SYSTOLIC BLOOD PRESSURE: 108 MMHG | BODY MASS INDEX: 26.69 KG/M2 | DIASTOLIC BLOOD PRESSURE: 64 MMHG

## 2022-12-14 DIAGNOSIS — O09.892 HISTORY OF PREMATURE DELIVERY, CURRENTLY PREGNANT, SECOND TRIMESTER: Primary | ICD-10-CM

## 2022-12-14 DIAGNOSIS — Z3A.18 18 WEEKS GESTATION OF PREGNANCY: ICD-10-CM

## 2022-12-14 PROBLEM — Z3A.10 10 WEEKS GESTATION OF PREGNANCY: Status: ACTIVE | Noted: 2022-10-19

## 2022-12-14 NOTE — PROGRESS NOTES
Routine Prenatal Visit  [address] [facility], [address], [facility], [address] [facility]    Assessment/Plan:  Sofia Ibrahim is a 32y o  year old  at 6w6d who presents for routine prenatal visit  1  Previous  delivery, antepartum, first trimester  Assessment & Plan:  36 3 week delivery followed by a 38 5 week delivery  2  10 weeks gestation of pregnancy    3  Unsure of LMP (last menstrual period) as reason for ultrasound scan        Subjective:     CC: Prenatal care    Chip Morel is a 32 y o  [id number] female who presents for routine prenatal care at 6w6d  Pregnancy ROS: no leakage of fluid, pelvic pain, or vaginal bleeding  no fetal movement  The following portions of the patient's history were reviewed and updated as appropriate: allergies, current medications, past family history, past medical history, obstetric history, gynecologic history, past social history, past surgical history and problem list       Objective:  LMP 2022 (Exact Date)   Pregravid Weight/BMI: 60 8 kg (134 lb) (BMI 22 46)  Current Weight:     Total Weight Gain: 2 449 kg (5 lb 6 4 oz)        General: Well appearing, no distress  Respiratory: Unlabored breathing  Cardiovascular: Regular rate  Abdomen: Soft, gravid, nontender  Fundal Height: Appropriate for gestational age  Extremities: Warm and well perfused  Non tender

## 2022-12-14 NOTE — LETTER
December 14, 2022     Raf Posada MD  [facility] [address]  [address] [place] [address],[address]  [facility]    Patient: Jovany Solis   YOB: 1995   Date of Visit: 12/14/2022       Dear Dr Satcy Haney: Thank you for referring Jovany Solis to me for evaluation  Below are my notes for this consultation  If you have questions, please do not hesitate to call me  I look forward to following your patient along with you           Sincerely,        Vesta Elise MD        CC: No Recipients  Vesta Elise MD  12/11/2022  4:28 PM  Sign when Signing Visit  Please refer to the [facility] ultrasound report in Ob Procedures for additional information regarding today's visit

## 2022-12-14 NOTE — PROGRESS NOTES
Ultrasound Probe Disinfection    A transvaginal ultrasound was performed  Prior to use, disinfection was performed with High Level Disinfection Process (Trophon)  Probe serial number [id number]: [id number] was used        Rafa Sterling  12/14/22  9:41 AM

## 2022-12-23 ENCOUNTER — ROUTINE PRENATAL (OUTPATIENT)
Dept: PERINATAL CARE | Facility: OTHER | Age: 27
End: 2022-12-23

## 2022-12-23 VITALS
HEIGHT: 65 IN | BODY MASS INDEX: 26.59 KG/M2 | DIASTOLIC BLOOD PRESSURE: 56 MMHG | SYSTOLIC BLOOD PRESSURE: 102 MMHG | WEIGHT: 159.6 LBS | HEART RATE: 82 BPM

## 2022-12-23 DIAGNOSIS — O09.212 PREVIOUS PRETERM DELIVERY, ANTEPARTUM, SECOND TRIMESTER: ICD-10-CM

## 2022-12-23 DIAGNOSIS — Z3A.20 20 WEEKS GESTATION OF PREGNANCY: ICD-10-CM

## 2022-12-23 DIAGNOSIS — Z36.3 ENCOUNTER FOR ANTENATAL SCREENING FOR MALFORMATION USING ULTRASOUND: Primary | ICD-10-CM

## 2022-12-23 PROBLEM — O44.40 LOW-LYING PLACENTA: Status: RESOLVED | Noted: 2022-11-30 | Resolved: 2022-12-23

## 2022-12-23 NOTE — PROGRESS NOTES
Ultrasound Probe Disinfection    A transvaginal ultrasound was performed  Prior to use, disinfection was performed with High Level Disinfection Process (Trophon)  Probe serial number U3: [id number] was used        Maxcine Knuckles  12/23/22  11:20 AM

## 2022-12-23 NOTE — PROGRESS NOTES
The patient was seen today for an ultrasound  Please see ultrasound report (located under Ob Procedures) for additional details  Thank you very much for allowing us to participate in the care of this very nice patient  Should you have any questions, please do not hesitate to contact me  Frederick Ferreira MD [address]  Attending Physician, [place]

## 2023-01-10 ENCOUNTER — ROUTINE PRENATAL (OUTPATIENT)
Dept: OBGYN CLINIC | Facility: CLINIC | Age: 28
End: 2023-01-10

## 2023-01-10 VITALS — BODY MASS INDEX: 27.47 KG/M2 | SYSTOLIC BLOOD PRESSURE: 110 MMHG | WEIGHT: 163.8 LBS | DIASTOLIC BLOOD PRESSURE: 65 MMHG

## 2023-01-10 DIAGNOSIS — Z28.39 RUBELLA NON-IMMUNE STATUS, ANTEPARTUM: ICD-10-CM

## 2023-01-10 DIAGNOSIS — O09.212 PREVIOUS PRETERM DELIVERY, ANTEPARTUM, SECOND TRIMESTER: Primary | ICD-10-CM

## 2023-01-10 DIAGNOSIS — O09.899 RUBELLA NON-IMMUNE STATUS, ANTEPARTUM: ICD-10-CM

## 2023-01-10 DIAGNOSIS — Z3A.22 22 WEEKS GESTATION OF PREGNANCY: ICD-10-CM

## 2023-01-10 LAB
SL AMB  POCT GLUCOSE, UA: NEGATIVE
SL AMB POCT URINE PROTEIN: NEGATIVE

## 2023-01-10 NOTE — PROGRESS NOTES
Routine Prenatal Visit  [facility] - Brayan Talamantes    Assessment/Plan:  Jessica Shankar is a 32y o  year old  at 22w5d who presents for routine prenatal visit  1  Previous  delivery, antepartum, second trimester    2  22 weeks gestation of pregnancy  Assessment & Plan:  Patient has questions regarding placental encapsulation  Reviewed no good evidence of benefit  There is a thought that could be helpful for postpartum depression  There is a theoretical risk of infection and no standardized way to encapsulate leading to many not reputable companies that may not be following appropriate technique which can increase risk of bacterial infection to mom and baby  Continue routine prenatal care  Return to office for ob check in 4 weeks  Orders:  -     POCT urine dip    3  Rubella non-immune status, antepartum  Assessment & Plan:  Consider MMR postpartum  Next OB Visit 4 weeks  Subjective:     CC: Prenatal care    Lokesh Ibrahim is a 32 y o  [id number] female who presents for routine prenatal care at 22w5d  Pregnancy ROS: Good FM, No N/V, HA, cramping, VB, LOF, edema, domestic violence, or smoking  Tolerating PNV          The following portions of the patient's history were reviewed and updated as appropriate: allergies, current medications, past family history, past medical history, obstetric history, gynecologic history, past social history, past surgical history and problem list       Objective:  /65   Wt 74 3 kg (163 lb 12 8 oz)   LMP 2022 (Exact Date)   BMI 27 47 kg/m²   Pregravid Weight/BMI: 60 8 kg (134 lb) (BMI 22 46)  Current Weight: 74 3 kg (163 lb 12 8 oz)   Total Weight Gain: 13 5 kg (29 lb 12 8 oz)   Pre-Leonard Vitals    Flowsheet Row Most Recent Value   Prenatal Assessment    Fetal Heart Rate 150   Fundal Height (cm) 22 cm   Movement Present   Prenatal Vitals    Blood Pressure 110/65   Weight - Scale 74 3 kg (163 lb 12 8 oz)   Urine Albumin/Glucose Dilation/Effacement/Station    Vaginal Drainage    Edema    LLE Edema None   RLE Edema None   Facial Edema None           General: Well appearing, no distress  Abdomen: Soft, gravid, nontender  Fundal Height: Appropriate for gestational age  Extremities: Warm and well perfused  Non tender

## 2023-01-11 ENCOUNTER — ROUTINE PRENATAL (OUTPATIENT)
Dept: PERINATAL CARE | Facility: OTHER | Age: 28
End: 2023-01-11

## 2023-01-11 VITALS
BODY MASS INDEX: 27.09 KG/M2 | SYSTOLIC BLOOD PRESSURE: 114 MMHG | HEART RATE: 79 BPM | DIASTOLIC BLOOD PRESSURE: 66 MMHG | HEIGHT: 65 IN | WEIGHT: 162.6 LBS

## 2023-01-11 DIAGNOSIS — O09.212 PREVIOUS PRETERM DELIVERY, ANTEPARTUM, SECOND TRIMESTER: Primary | ICD-10-CM

## 2023-01-11 DIAGNOSIS — Z36.86 ENCOUNTER FOR ANTENATAL SCREENING FOR CERVICAL LENGTH: ICD-10-CM

## 2023-01-11 DIAGNOSIS — Z3A.22 22 WEEKS GESTATION OF PREGNANCY: ICD-10-CM

## 2023-01-11 NOTE — PATIENT INSTRUCTIONS
Thank you for choosing us for your  care today  If you have any questions about your ultrasound or care, please do not hesitate to contact us or your primary obstetrician  Some general instructions for your pregnancy are:    Protect against coronavirus: get vaccinated - pregnant women are increased risk of severe COVID  Notify your primary care doctor if you have any symptoms  Exercise: Aim for 22 minutes per day (150 minutes per week) of regular exercise  Walking is great! Nutrition: aim for calcium-rich and iron-rich foods as well as healthy sources of protein  Learn about Preeclampsia: preeclampsia is a common, serious high blood pressure complication in pregnancy  A blood pressure of 471PQKU (systolic or top number) or 94QUKZ (diastolic or bottom number) is not normal and needs evaluation by your doctor  Aspirin is sometimes prescribed in early pregnancy to prevent preeclampsia in women with risk factors - ask your obstetrician if you should be on this medication  If you smoke, try to reduce how many cigarettes you smoke or try to quit completely  Do not vape  Other warning signs to watch out for in pregnancy or postpartum: chest pain, obstructed breathing or shortness of breath, seizures, thoughts of hurting yourself or your baby, bleeding, a painful or swollen leg, fever, or headache (see [facility] POST-BIRTH Warning Signs campaign)  If these happen call 911  Itching is also not normal in pregnancy and if you experience this, especially over your hands and feet, potentially worse at night, notify your doctors

## 2023-01-11 NOTE — PROGRESS NOTES
[id number] [facility]: Ms Giuseppe Meraz was seen today for serial cervical length screening ultrasound  See ultrasound report under "OB Procedures" tab  The time spent on this established patient on the encounter date included 4 minutes previsit service time reviewing records and precharting, 6 minutes face-to-face service time counseling regarding results and coordinating care, and  5 minutes charting, totalling 15 minutes    Please don't hesitate to contact our office with any concerns or questions   -Patricia Galindo MD

## 2023-01-11 NOTE — PROGRESS NOTES
10-Apr-2018 Ultrasound Probe Disinfection    A transvaginal ultrasound was performed  Prior to use, disinfection was performed with High Level Disinfection Process (Trophon)  Probe serial number U2: [id number] was used        Simi Gutierrez  01/11/23  10:03 AM

## 2023-01-16 ENCOUNTER — TELEPHONE (OUTPATIENT)
Dept: OBGYN CLINIC | Facility: CLINIC | Age: 28
End: 2023-01-16

## 2023-01-16 NOTE — TELEPHONE ENCOUNTER
Andrey Hamilton left message she has talked with providers in past about placental encapsulation  She would like any input on what the provider think about [employer]  Return call to Andrey Hamilton, advised will forward to RADHA Cook who she has recently seen for evaluation  Advised our providers have worked with Lito Marin in past  May also wish to discuss with Dr Lima Sosa at next [facility] visit

## 2023-01-19 PROBLEM — Z28.39 RUBELLA NON-IMMUNE STATUS, ANTEPARTUM: Status: ACTIVE | Noted: 2023-01-19

## 2023-01-19 PROBLEM — O09.899 RUBELLA NON-IMMUNE STATUS, ANTEPARTUM: Status: ACTIVE | Noted: 2023-01-19

## 2023-01-19 NOTE — ASSESSMENT & PLAN NOTE
Patient has questions regarding placental encapsulation  Reviewed no good evidence of benefit  There is a thought that could be helpful for postpartum depression  There is a theoretical risk of infection and no standardized way to encapsulate leading to many not reputable companies that may not be following appropriate technique which can increase risk of bacterial infection to mom and baby  Continue routine prenatal care  Return to office for ob check in 4 weeks

## 2023-02-06 DIAGNOSIS — Z36.89 ENCOUNTER FOR OTHER SPECIFIED ANTENATAL SCREENING: ICD-10-CM

## 2023-02-06 DIAGNOSIS — Z3A.26 26 WEEKS GESTATION OF PREGNANCY: Primary | ICD-10-CM

## 2023-02-07 ENCOUNTER — ROUTINE PRENATAL (OUTPATIENT)
Dept: OBGYN CLINIC | Facility: CLINIC | Age: 28
End: 2023-02-07

## 2023-02-07 VITALS
DIASTOLIC BLOOD PRESSURE: 80 MMHG | BODY MASS INDEX: 28.06 KG/M2 | HEIGHT: 65 IN | WEIGHT: 168.4 LBS | SYSTOLIC BLOOD PRESSURE: 118 MMHG

## 2023-02-07 DIAGNOSIS — Z3A.26 26 WEEKS GESTATION OF PREGNANCY: ICD-10-CM

## 2023-02-07 DIAGNOSIS — Z36.89 ENCOUNTER FOR OTHER SPECIFIED ANTENATAL SCREENING: ICD-10-CM

## 2023-02-07 DIAGNOSIS — O09.899 RUBELLA NON-IMMUNE STATUS, ANTEPARTUM: ICD-10-CM

## 2023-02-07 DIAGNOSIS — Z28.39 RUBELLA NON-IMMUNE STATUS, ANTEPARTUM: ICD-10-CM

## 2023-02-07 DIAGNOSIS — O09.212 PREVIOUS PRETERM DELIVERY, ANTEPARTUM, SECOND TRIMESTER: Primary | ICD-10-CM

## 2023-02-07 LAB
SL AMB  POCT GLUCOSE, UA: ABNORMAL
SL AMB POCT URINE PROTEIN: ABNORMAL

## 2023-02-07 NOTE — ASSESSMENT & PLAN NOTE
28 week labs ordered  Interested in placental encapsulation   She has a company that she plan to use with a kit for packaging/shipping

## 2023-02-07 NOTE — PROGRESS NOTES
Routine Prenatal Visit  [address] [facility], [address], [facility], [address] [facility]    Assessment/Plan:  Celeste Vallecillo is a 32y o  year old  at 26w5d who presents for routine prenatal visit  1  Previous  delivery, antepartum, second trimester    2  Rubella non-immune status, antepartum    3  26 weeks gestation of pregnancy  Assessment & Plan:  28 week labs ordered  Interested in placental encapsulation  She has a company that she plan to use with a kit for packaging/shipping    Orders:  -     POCT urine dip    4  Encounter for other specified  screening  -     CBC; Future  -     Glucose, 1H PG; Future  -     RPR, Rfx Qn RPR/Confirm TP; Future  -     CBC  -     Glucose, 1H PG  -     RPR, Rfx Qn RPR/Confirm TP        Subjective:     CC: Prenatal care    Charlie Pimentel is a 32 y o   female who presents for routine prenatal care at 26w5d  Pregnancy ROS: no leakage of fluid, pelvic pain, or vaginal bleeding  normal fetal movement      The following portions of the patient's history were reviewed and updated as appropriate: allergies, current medications, past family history, past medical history, obstetric history, gynecologic history, past social history, past surgical history and problem list       Objective:  /80 (BP Location: Left arm, Patient Position: Sitting, Cuff Size: Standard)   Ht 5' 4 75" (1 645 m)   Wt 76 4 kg (168 lb 6 4 oz)   LMP 2022 (Exact Date)   BMI 28 24 kg/m²   Pregravid Weight/BMI: 60 8 kg (134 lb) (BMI 22 46)  Current Weight: 76 4 kg (168 lb 6 4 oz)   Total Weight Gain: 15 6 kg (34 lb 6 4 oz)   Pre- Vitals    Flowsheet Row Most Recent Value   Prenatal Assessment    Fetal Heart Rate 150   Fundal Height (cm) 28 cm   Movement Present   Prenatal Vitals    Blood Pressure 118/80   Weight - Scale 76 4 kg (168 lb 6 4 oz)   Urine Albumin/Glucose    Dilation/Effacement/Station    Vaginal Drainage    Edema            General: Well appearing, no distress  Respiratory: Unlabored breathing  Cardiovascular: Regular rate  Abdomen: Soft, gravid, nontender  Fundal Height: Appropriate for gestational age  Extremities: Warm and well perfused  Non tender

## 2023-02-17 LAB
EXTERNAL HEMOGLOBIN: 12.9 G/DL
EXTERNAL PLATELET COUNT: 208 K/ΜL
EXTERNAL SYPHILIS TOTAL IGG/IGM SCREENING: NORMAL

## 2023-02-18 LAB
ERYTHROCYTE [DISTWIDTH] IN BLOOD BY AUTOMATED COUNT: 12.1 % (ref 11.7–15.4)
GLUCOSE 1H P 50 G GLC PO SERPL-MCNC: 138 MG/DL (ref 70–139)
HCT VFR BLD AUTO: 37.8 % (ref 34–46.6)
HGB BLD-MCNC: 12.9 G/DL (ref 11.1–15.9)
MCH RBC QN AUTO: 30.7 PG (ref 26.6–33)
MCHC RBC AUTO-ENTMCNC: 34.1 G/DL (ref 31.5–35.7)
MCV RBC AUTO: 90 FL (ref 79–97)
PLATELET # BLD AUTO: 208 X10E3/UL (ref 150–450)
RBC # BLD AUTO: 4.2 X10E6/UL (ref 3.77–5.28)
RPR SER QL: NON REACTIVE
WBC # BLD AUTO: 9.1 X10E3/UL (ref 3.4–10.8)

## 2023-02-21 ENCOUNTER — ROUTINE PRENATAL (OUTPATIENT)
Dept: OBGYN CLINIC | Facility: CLINIC | Age: 28
End: 2023-02-21

## 2023-02-21 VITALS — DIASTOLIC BLOOD PRESSURE: 66 MMHG | WEIGHT: 170 LBS | SYSTOLIC BLOOD PRESSURE: 100 MMHG | BODY MASS INDEX: 28.51 KG/M2

## 2023-02-21 DIAGNOSIS — Z3A.28 28 WEEKS GESTATION OF PREGNANCY: Primary | ICD-10-CM

## 2023-02-21 DIAGNOSIS — O99.810 ABNORMAL GLUCOSE COMPLICATING PREGNANCY: ICD-10-CM

## 2023-02-21 DIAGNOSIS — O99.810 ABNORMAL MATERNAL GLUCOSE TOLERANCE, ANTEPARTUM: Primary | ICD-10-CM

## 2023-02-21 DIAGNOSIS — Z23 NEED FOR TDAP VACCINATION: ICD-10-CM

## 2023-02-21 DIAGNOSIS — O09.212 PREVIOUS PRETERM DELIVERY, ANTEPARTUM, SECOND TRIMESTER: ICD-10-CM

## 2023-02-21 DIAGNOSIS — O09.899 RUBELLA NON-IMMUNE STATUS, ANTEPARTUM: ICD-10-CM

## 2023-02-21 DIAGNOSIS — Z28.39 RUBELLA NON-IMMUNE STATUS, ANTEPARTUM: ICD-10-CM

## 2023-02-21 LAB
SL AMB  POCT GLUCOSE, UA: NEGATIVE
SL AMB POCT URINE PROTEIN: NEGATIVE

## 2023-02-21 NOTE — PROGRESS NOTES
Routine Prenatal Visit  [address] [facility], [address], [facility], [address] [facility] [address]    Assessment/Plan:  Moustapha Castro is a 32y o  year old  at 28w5d who presents for routine prenatal visit  1  28 weeks gestation of pregnancy  -     POCT urine dip    2  Abnormal glucose complicating pregnancy  -     Glucose JOSEF 3HR 100GM PREG PTS; Future  -     Glucose JOSEF 3HR 100GM PREG PTS    3  Need for Tdap vaccination  -     Tdap Vaccine greater than or equal to 8yo    + fm  No utcx ,  No leaking of fluid  + dw pt  3 hour glucose and   Abn  Glucose screens in  Pregnancy  Hand washing reviewed  TDAP today   Subjective:     CC: Prenatal care    Jordyn Orta is a 32 y o  [id number] female who presents for routine prenatal care at 28w5d  Pregnancy ROS: no  leakage of fluid, pelvic pain, or vaginal bleeding   + fetal movement  The following portions of the patient's history were reviewed and updated as appropriate: allergies, current medications, past family history, past medical history, obstetric history, gynecologic history, past social history, past surgical history and problem list       Objective:  /66 (BP Location: Left arm, Patient Position: Sitting, Cuff Size: Standard)   Wt 77 1 kg (170 lb)   LMP 2022 (Exact Date)   BMI 28 51 kg/m²   Pregravid Weight/BMI: 60 8 kg (134 lb) (BMI 22 46)  Current Weight: 77 1 kg (170 lb)   Total Weight Gain: 16 3 kg (36 lb)   Pre- Vitals    Flowsheet Row Most Recent Value   Prenatal Assessment    Fetal Heart Rate 146-152   Fundal Height (cm) 29 cm   Movement Present   Prenatal Vitals    Blood Pressure 100/66   Weight - Scale 77 1 kg (170 lb)   Urine Albumin/Glucose    Dilation/Effacement/Station    Vaginal Drainage    Draining Fluid No   Edema    LLE Edema None   RLE Edema None   Facial Edema None           General: Well appearing, no distress  Respiratory: Unlabored breathing  Cardiovascular: Regular rate    Abdomen: Soft, gravid, nontender  Fundal Height: Appropriate for gestational age  Extremities: Warm and well perfused  Non tender

## 2023-03-02 LAB
GLUCOSE 1H P 100 G GLC PO SERPL-MCNC: 143 MG/DL (ref 70–179)
GLUCOSE 2H P 100 G GLC PO SERPL-MCNC: 133 MG/DL (ref 70–154)
GLUCOSE 3H P 100 G GLC PO SERPL-MCNC: 108 MG/DL (ref 70–139)
GLUCOSE P FAST SERPL-MCNC: 79 MG/DL (ref 70–94)
SL AMB NOTE:: NORMAL

## 2023-03-08 ENCOUNTER — ROUTINE PRENATAL (OUTPATIENT)
Dept: OBGYN CLINIC | Facility: CLINIC | Age: 28
End: 2023-03-08

## 2023-03-08 VITALS
HEIGHT: 65 IN | WEIGHT: 175 LBS | BODY MASS INDEX: 29.16 KG/M2 | SYSTOLIC BLOOD PRESSURE: 110 MMHG | DIASTOLIC BLOOD PRESSURE: 70 MMHG

## 2023-03-08 DIAGNOSIS — O09.899 RUBELLA NON-IMMUNE STATUS, ANTEPARTUM: Primary | ICD-10-CM

## 2023-03-08 DIAGNOSIS — Z3A.30 30 WEEKS GESTATION OF PREGNANCY: ICD-10-CM

## 2023-03-08 DIAGNOSIS — O09.212 PREVIOUS PRETERM DELIVERY, ANTEPARTUM, SECOND TRIMESTER: ICD-10-CM

## 2023-03-08 DIAGNOSIS — Z28.39 RUBELLA NON-IMMUNE STATUS, ANTEPARTUM: Primary | ICD-10-CM

## 2023-03-08 LAB
SL AMB  POCT GLUCOSE, UA: NORMAL
SL AMB POCT URINE PROTEIN: NORMAL

## 2023-03-08 NOTE — PROGRESS NOTES
Routine Prenatal Visit  [address]   [address] [facility], [address]    Assessment/Plan:  Vannesa Figueroa is a 32y o  year old  at 27w9d who presents for routine prenatal visit  1  Rubella non-immune status, antepartum  Assessment & Plan:  Recommend vaccination postpartum  2  30 weeks gestation of pregnancy  Assessment & Plan:  Continue routine prenatal care  Return to office for ob check in 2 weeks  Orders:  -     POCT urine dip    3  Previous  delivery, antepartum, second trimester      Next OB Visit 2 weeks  Subjective:     CC: Prenatal care    Robin Moreno is a 32 y o  [id number] female who presents for routine prenatal care at 30w6d  Pregnancy ROS: Good FM, No N/V, HA, cramping, VB, LOF, edema, domestic violence, or smoking  Tolerating PNV  The following portions of the patient's history were reviewed and updated as appropriate: allergies, current medications, past family history, past medical history, obstetric history, gynecologic history, past social history, past surgical history and problem list       Objective:  /70 (BP Location: Left arm, Patient Position: Standing, Cuff Size: Standard)   Ht 5' 4 75" (1 645 m)   Wt 79 4 kg (175 lb)   LMP 2022 (Exact Date)   BMI 29 35 kg/m²   Pregravid Weight/BMI: 60 8 kg (134 lb) (BMI 22 46)  Current Weight: 79 4 kg (175 lb)   Total Weight Gain: 18 6 kg (41 lb)   Pre-Leonard Vitals    Flowsheet Row Most Recent Value   Prenatal Assessment    Fetal Heart Rate 162   Fundal Height (cm) 31 cm   Movement Present   Prenatal Vitals    Blood Pressure 110/70   Weight - Scale 79 4 kg (175 lb)   Urine Albumin/Glucose    Dilation/Effacement/Station    Vaginal Drainage    Draining Fluid No   Edema    LLE Edema None   RLE Edema None   Facial Edema None           General: Well appearing, no distress  Abdomen: Soft, gravid, nontender  Fundal Height: Appropriate for gestational age    Extremities: Warm and well perfused  Non tender

## 2023-03-22 ENCOUNTER — ROUTINE PRENATAL (OUTPATIENT)
Dept: OBGYN CLINIC | Facility: CLINIC | Age: 28
End: 2023-03-22

## 2023-03-22 VITALS
WEIGHT: 176 LBS | HEIGHT: 65 IN | BODY MASS INDEX: 29.32 KG/M2 | DIASTOLIC BLOOD PRESSURE: 60 MMHG | SYSTOLIC BLOOD PRESSURE: 98 MMHG

## 2023-03-22 DIAGNOSIS — O09.212 PREVIOUS PRETERM DELIVERY, ANTEPARTUM, SECOND TRIMESTER: Primary | ICD-10-CM

## 2023-03-22 DIAGNOSIS — Z3A.32 32 WEEKS GESTATION OF PREGNANCY: ICD-10-CM

## 2023-03-22 PROBLEM — Z87.59 HISTORY OF PRECIPITOUS DELIVERY: Status: ACTIVE | Noted: 2023-03-22

## 2023-03-22 LAB
SL AMB  POCT GLUCOSE, UA: NEGATIVE
SL AMB POCT URINE PROTEIN: NEGATIVE

## 2023-03-22 NOTE — PROGRESS NOTES
Please refer to the [facility] ultrasound report in Ob Procedures for additional information regarding today's visit

## 2023-03-22 NOTE — PROGRESS NOTES
Routine Prenatal Visit  [address], [place]aryn Zheng, [address]    Assessment/Plan:  Jule Koyanagi is a 32y o  year old  at [address] [place] [address] who presents for routine prenatal visit  1  Previous  delivery, antepartum, second trimester  Assessment & Plan:  Growth ultrasound scheduled at 32 weeks  2  32 weeks gestation of pregnancy  Assessment & Plan:  Continue routine prenatal care  Return to office for ob check in 2 weeks  Orders:  -     POCT urine dip      Next OB Visit 2 weeks  Subjective:     CC: Prenatal care    Chhaya Smith is a 32 y o  [id number] female who presents for routine prenatal care at [address] [place] [address]  Pregnancy ROS: Good Fm, No N/V, HA, cramping, VB, LOF, edema, domestic violence, or smoking  Tolerating PNV  The following portions of the patient's history were reviewed and updated as appropriate: allergies, current medications, past family history, past medical history, obstetric history, gynecologic history, past social history, past surgical history and problem list       Objective:  BP 98/60   Ht 5' 4 75" (1 645 m)   Wt 79 8 kg (176 lb)   LMP 2022 (Exact Date)   BMI 29 51 kg/m²   Pregravid Weight/BMI: 60 8 kg (134 lb) (BMI 22 46)  Current Weight: 79 8 kg (176 lb)   Total Weight Gain: 19 1 kg (42 lb)   Pre- Vitals    Flowsheet Row Most Recent Value   Prenatal Assessment    Fetal Heart Rate 160   Fundal Height (cm) 32 cm   Movement Present   Prenatal Vitals    Blood Pressure 98/60   Weight - Scale 79 8 kg (176 lb)   Urine Albumin/Glucose    Dilation/Effacement/Station    Vaginal Drainage    Draining Fluid No   Edema    LLE Edema None   RLE Edema None   Facial Edema None           General: Well appearing, no distress  Abdomen: Soft, gravid, nontender  Fundal Height: Appropriate for gestational age  Extremities: Warm and well perfused  Non tender

## 2023-03-24 ENCOUNTER — ULTRASOUND (OUTPATIENT)
Dept: PERINATAL CARE | Facility: OTHER | Age: 28
End: 2023-03-24

## 2023-03-24 VITALS
HEART RATE: 88 BPM | HEIGHT: 65 IN | DIASTOLIC BLOOD PRESSURE: 78 MMHG | WEIGHT: 176.6 LBS | SYSTOLIC BLOOD PRESSURE: 112 MMHG | BODY MASS INDEX: 29.42 KG/M2

## 2023-03-24 DIAGNOSIS — O09.893 HISTORY OF PRETERM DELIVERY, CURRENTLY PREGNANT, THIRD TRIMESTER: ICD-10-CM

## 2023-03-24 DIAGNOSIS — Z36.4 ULTRASOUND FOR ANTENATAL SCREENING FOR FETAL GROWTH RESTRICTION: Primary | ICD-10-CM

## 2023-03-24 DIAGNOSIS — Z3A.33 33 WEEKS GESTATION OF PREGNANCY: ICD-10-CM

## 2023-03-24 NOTE — LETTER
March 24, 2023     [address]  [address] [address]  [place]    Patient: Donna Rahman   YOB: 1995   Date of Visit: 3/24/2023       Dear Dr Penny Lemus: Thank you for referring Donna Rahman to me for evaluation  Below are my notes for this consultation  If you have questions, please do not hesitate to call me  I look forward to following your patient along with you           Sincerely,        Raeann Lind MD        CC: No Recipients  Raeann Lind MD  3/22/2023 10:34 AM  Sign when Signing Visit  Please refer to the [facility] ultrasound report in Ob Procedures for additional information regarding today's visit

## 2023-03-24 NOTE — PATIENT INSTRUCTIONS
Kick Counts in Pregnancy   WHAT YOU NEED TO KNOW:   Kick counts measure how much your baby is moving in your womb  A kick from your baby can be felt as a twist, turn, swish, roll, or jab  It is common to feel your baby kicking at 26 to 28 weeks of pregnancy  You may feel your baby kick as early as 20 weeks of pregnancy  You may want to start counting at 28 weeks  DISCHARGE INSTRUCTIONS:   Contact your doctor immediately if:   You feel a change in the number of kicks or movements of your baby  You feel fewer than 10 kicks within 2 hours  You have questions or concerns about your baby's movements  Why measure kick counts:  Your baby's movement may provide information about your baby's health  He or she may move less, or not at all, if there are problems  Your baby may move less if he or she is not getting enough oxygen or nutrition from the placenta  Do not smoke while you are pregnant  Smoking decreases the amount of oxygen that gets to your baby  Talk to your healthcare provider if you need help to quit smoking  Tell your healthcare provider as soon as you feel a change in your baby's movements  When to measure kick counts:   Measure kick counts at the same time every day  Measure kick counts when your baby is awake and most active  Your baby may be most active in the evening  How to measure kick counts:  Check that your baby is awake before you measure kick counts  You can wake up your baby by lightly pushing on your belly, walking, or drinking something cold  Your healthcare provider may tell you different ways to measure kick counts  You may be told to do the following:  Use a chart or clock to keep track of the time you start and finish counting  Sit in a chair or lie on your left side  Place your hands on the largest part of your belly  Count until you reach 10 kicks  Write down how much time it takes to count 10 kicks  It may take 30 minutes to 2 hours to count 10 kicks  It should not take more than 2 hours to count 10 kicks  Follow up with your doctor as directed:  Write down your questions so you remember to ask them during your visits  © Copyright [employer] 2022 Information is for End User's use only and may not be sold, redistributed or otherwise used for commercial purposes  The above information is an  only  It is not intended as medical advice for individual conditions or treatments  Talk to your doctor, nurse or pharmacist before following any medical regimen to see if it is safe and effective for you

## 2023-03-28 ENCOUNTER — OFFICE VISIT (OUTPATIENT)
Dept: FAMILY MEDICINE CLINIC | Facility: CLINIC | Age: 28
End: 2023-03-28

## 2023-03-28 VITALS
TEMPERATURE: 96.5 F | HEIGHT: 65 IN | WEIGHT: 176.2 LBS | DIASTOLIC BLOOD PRESSURE: 76 MMHG | BODY MASS INDEX: 29.36 KG/M2 | SYSTOLIC BLOOD PRESSURE: 122 MMHG | HEART RATE: 101 BPM | RESPIRATION RATE: 18 BRPM | OXYGEN SATURATION: 99 %

## 2023-03-28 DIAGNOSIS — Z00.00 ANNUAL PHYSICAL EXAM: Primary | ICD-10-CM

## 2023-03-28 DIAGNOSIS — Z3A.32 32 WEEKS GESTATION OF PREGNANCY: ICD-10-CM

## 2023-03-28 NOTE — PROGRESS NOTES
[address] PRACTICE    NAME: Mikael Slater  AGE: 32 y o  SEX: female  : 1995     DATE: 3/28/2023     Assessment and Plan:     Problem List Items Addressed This Visit        Other    32 weeks gestation of pregnancy     Continue routine prenatal care        Other Visit Diagnoses     Annual physical exam    -  Primary          Immunizations and preventive care screenings were discussed with patient today  Appropriate education was printed on patient's after visit summary  Counseling:  Alcohol/drug use: discussed moderation in alcohol intake, the recommendations for healthy alcohol use, and avoidance of illicit drug use  Dental Health: discussed importance of regular tooth brushing, flossing, and dental visits  Injury prevention: discussed safety/seat belts, safety helmets, smoke detectors, carbon dioxide detectors, and smoking near bedding or upholstery  Sexual health: discussed sexually transmitted diseases, partner selection, use of condoms, avoidance of unintended pregnancy, and contraceptive alternatives  · Exercise: the importance of regular exercise/physical activity was discussed  Recommend exercise 3-5 times per week for at least 30 minutes  Return in about 1 year (around 3/28/2024) for Annual physical      Chief Complaint:     Chief Complaint   Patient presents with   • Annual Exam      History of Present Illness:     Adult Annual Physical   Patient here for a comprehensive physical exam      Diet and Physical Activity  · Diet/Nutrition: well balanced diet  · Exercise: moderate cardiovascular exercise        Depression Screening  PHQ-2/9 Depression Screening    Little interest or pleasure in doing things: 0 - not at all  Feeling down, depressed, or hopeless: 0 - not at all  PHQ-2 Score: 0  PHQ-2 Interpretation: Negative depression screen         /GYN Health  · Currently pregnant      Review of Systems: Review of Systems   Constitutional: Negative for chills, fatigue and fever  HENT: Negative for congestion, postnasal drip, rhinorrhea and sinus pressure  Eyes: Negative for photophobia and visual disturbance  Respiratory: Negative for cough and shortness of breath  Cardiovascular: Negative for chest pain, palpitations and leg swelling  Gastrointestinal: Negative for abdominal pain, constipation, diarrhea, nausea and vomiting  Genitourinary: Negative for difficulty urinating and dysuria  Musculoskeletal: Negative for arthralgias and myalgias  Skin: Negative for color change and rash  Neurological: Negative for dizziness, weakness, light-headedness and headaches  Past Medical History:     History reviewed  No pertinent past medical history     Past Surgical History:     Past Surgical History:   Procedure Laterality Date   • WISDOM TOOTH EXTRACTION        Social History:     Social History     Socioeconomic History   • Marital status: Unknown     Spouse name: None   • Number of children: None   • Years of education: None   • Highest education level: None   Occupational History   • None   Tobacco Use   • Smoking status: Never     Passive exposure: Never   • Smokeless tobacco: Never   Vaping Use   • Vaping Use: Never used   Substance and Sexual Activity   • Alcohol use: Not Currently   • Drug use: Never   • Sexual activity: Yes   Other Topics Concern   • None   Social History Narrative   • None     Social Determinants of Health     Financial Resource Strain: Not on file   Food Insecurity: Not on file   Transportation Needs: Not on file   Physical Activity: Not on file   Stress: Not on file   Social Connections: Not on file   Intimate Partner Violence: Not on file   Housing Stability: Not on file      Family History:     Family History   Problem Relation Age of Onset   • No Known Problems Mother    • Pancreatic cancer Father 61      Current Medications:     Current Outpatient Medications "  Medication Sig Dispense Refill   • cholecalciferol (VITAMIN D3) 400 units tablet Take 400 Units by mouth daily     • Prenatal Vit-Fe Fumarate-FA (PRENATAL VITAMIN PO) Take 1 capsule by mouth daily       No current facility-administered medications for this visit  Allergies:     No Known Allergies   Physical Exam:     /76 (BP Location: Left arm, Patient Position: Sitting, Cuff Size: Standard)   Pulse 101   Temp (!) 96 5 °F (35 8 °C) (Tympanic)   Resp 18   Ht 5' 4 75\" (1 645 m)   Wt 79 9 kg (176 lb 3 2 oz)   LMP 08/04/2022 (Exact Date)   SpO2 99%   BMI 29 55 kg/m²     Physical Exam  Constitutional:       General: She is not in acute distress  Appearance: Normal appearance  She is not ill-appearing, toxic-appearing or diaphoretic  HENT:      Head: Normocephalic and atraumatic  Right Ear: Tympanic membrane and ear canal normal       Left Ear: Tympanic membrane and ear canal normal       Nose: Nose normal  No congestion  Mouth/Throat:      Mouth: Mucous membranes are moist       Pharynx: Oropharynx is clear  No oropharyngeal exudate  Eyes:      Extraocular Movements: Extraocular movements intact  Conjunctiva/sclera: Conjunctivae normal       Pupils: Pupils are equal, round, and reactive to light  Cardiovascular:      Rate and Rhythm: Normal rate and regular rhythm  Pulses: Normal pulses  Heart sounds: No murmur heard  Pulmonary:      Effort: Pulmonary effort is normal       Breath sounds: Normal breath sounds  No wheezing, rhonchi or rales  Abdominal:      General: Bowel sounds are normal  There is no distension  Palpations: Abdomen is soft  Tenderness: There is no abdominal tenderness  Musculoskeletal:         General: No swelling or tenderness  Normal range of motion  Cervical back: Normal range of motion and neck supple  Skin:     General: Skin is warm and dry  Capillary Refill: Capillary refill takes less than 2 seconds   " Neurological:      General: No focal deficit present  Mental Status: She is alert and oriented to person, place, and time  Cranial Nerves: No cranial nerve deficit  Psychiatric:         Mood and Affect: Mood normal          Behavior: Behavior normal          Thought Content:  Thought content normal           Mariluz Melendez DO   [address]

## 2023-04-05 ENCOUNTER — TELEPHONE (OUTPATIENT)
Dept: OBGYN CLINIC | Facility: CLINIC | Age: 28
End: 2023-04-05

## 2023-04-05 PROBLEM — O36.8190 DECREASED FETAL MOVEMENT: Status: ACTIVE | Noted: 2023-04-05

## 2023-04-05 NOTE — TELEPHONE ENCOUNTER
Erica Pond called reporting decreased fetal movement  Has been doing her kick count since 6 am  Now 8:30  Has felt movement 4 maybe 5 times with decreased intensity  Recommended evaluation in L/D for reassurance  Patient in agreement and will proceed to [facility] L/D now  Dr Lluvia Galindo notified

## 2023-04-07 ENCOUNTER — ROUTINE PRENATAL (OUTPATIENT)
Dept: OBGYN CLINIC | Facility: CLINIC | Age: 28
End: 2023-04-07

## 2023-04-07 VITALS
BODY MASS INDEX: 29.32 KG/M2 | WEIGHT: 176 LBS | HEIGHT: 65 IN | SYSTOLIC BLOOD PRESSURE: 110 MMHG | DIASTOLIC BLOOD PRESSURE: 70 MMHG

## 2023-04-07 DIAGNOSIS — O09.212 PREVIOUS PRETERM DELIVERY, ANTEPARTUM, SECOND TRIMESTER: Primary | ICD-10-CM

## 2023-04-07 DIAGNOSIS — Z3A.35 35 WEEKS GESTATION OF PREGNANCY: ICD-10-CM

## 2023-04-07 LAB
SL AMB  POCT GLUCOSE, UA: NORMAL
SL AMB POCT URINE PROTEIN: NORMAL

## 2023-04-07 NOTE — ASSESSMENT & PLAN NOTE
Reassured patient no cervical changes since L&D visit  Continue to monitor  Reviewed s/s of labor and what to call with  Return to office for ob check, delivery consent, GBS in 1 week

## 2023-04-07 NOTE — PROGRESS NOTES
"Routine Prenatal Visit  [address] [facility], [address], [facility], [address]    Assessment/Plan:  Erica Pond is a 32y o  year old  at 35w1d who presents for routine prenatal visit  1  Previous  delivery, antepartum, second trimester    2  35 weeks gestation of pregnancy  Assessment & Plan:  Reassured patient no cervical changes since L&D visit  Continue to monitor  Reviewed s/s of labor and what to call with  Return to office for ob check, delivery consent, GBS in 1 week  Orders:  -     POCT urine dip      Next OB Visit 1 week  Subjective:     CC: Prenatal care    Michael Jha is a 32 y o  [id number] female who presents for routine prenatal care at 35w1d  Pregnancy ROS: Good Fm, Reports has been having episodes of cramping  Last about an hour or so and then go away on their own  Saw L&D on Wednesday reports was 1 cm dilated a that time  Would like to be check today to be sure no changes  Has had 3 episodes since L&D  Last one being this am  Not currently have contractions  No N/V, HA, VB, LOF, edema, domestic violence, or smoking  Tolerating PNV          The following portions of the patient's history were reviewed and updated as appropriate: allergies, current medications, past family history, past medical history, obstetric history, gynecologic history, past social history, past surgical history and problem list       Objective:  /70 (BP Location: Left arm, Patient Position: Sitting, Cuff Size: Standard)   Ht 5' 4 75\" (1 645 m)   Wt 79 8 kg (176 lb)   LMP 2022 (Exact Date)   BMI 29 51 kg/m²   Pregravid Weight/BMI: 60 8 kg (134 lb) (BMI 22 46)  Current Weight: 79 8 kg (176 lb)   Total Weight Gain: 19 1 kg (42 lb)   Pre-Leonard Vitals    Flowsheet Row Most Recent Value   Prenatal Assessment    Fetal Heart Rate 150   Fundal Height (cm) 35 cm   Movement Present   Presentation Vertex   Prenatal Vitals    Blood Pressure 110/70   Weight - Scale 79 8 kg " (176 lb)   Urine Albumin/Glucose    Dilation/Effacement/Station    Cervical Dilation 1   Cervical Effacement 0   Fetal Station -3   Vaginal Drainage    Draining Fluid No   Edema    LLE Edema None   RLE Edema None   Facial Edema None           General: Well appearing, no distress  Abdomen: Soft, gravid, nontender  Fundal Height: Appropriate for gestational age  Extremities: Warm and well perfused  Non tender

## 2023-04-18 PROBLEM — Z36.85 ENCOUNTER FOR ANTENATAL SCREENING FOR STREPTOCOCCUS B: Status: ACTIVE | Noted: 2023-04-18

## 2023-04-18 LAB — EXTERNAL GROUP B STREP ANTIGEN: NEGATIVE

## 2023-04-21 PROBLEM — Z3A.37 37 WEEKS GESTATION OF PREGNANCY: Status: ACTIVE | Noted: 2022-10-19

## 2023-04-26 ENCOUNTER — ROUTINE PRENATAL (OUTPATIENT)
Dept: OBGYN CLINIC | Facility: CLINIC | Age: 28
End: 2023-04-26

## 2023-04-26 VITALS — WEIGHT: 180.2 LBS | SYSTOLIC BLOOD PRESSURE: 120 MMHG | DIASTOLIC BLOOD PRESSURE: 60 MMHG | BODY MASS INDEX: 30.22 KG/M2

## 2023-04-26 DIAGNOSIS — O09.293 HISTORY OF PRECIPITOUS LABOR AND DELIVERIES IN THIRD TRIMESTER, ANTEPARTUM: Primary | ICD-10-CM

## 2023-04-26 DIAGNOSIS — Z3A.37 37 WEEKS GESTATION OF PREGNANCY: ICD-10-CM

## 2023-04-26 PROBLEM — O09.213 PREVIOUS PRETERM DELIVERY IN THIRD TRIMESTER, ANTEPARTUM: Status: ACTIVE | Noted: 2022-10-31

## 2023-04-26 LAB
SL AMB  POCT GLUCOSE, UA: NEGATIVE
SL AMB POCT URINE PROTEIN: NEGATIVE

## 2023-04-26 NOTE — ASSESSMENT & PLAN NOTE
Discussed h/o precipitous delivery at home last pregnancy  Reviewed option of elective IOL at 39 weeks and reviewed pros and cons  Patient declines scheduling at this time

## 2023-05-01 ENCOUNTER — HOSPITAL ENCOUNTER (INPATIENT)
Facility: HOSPITAL | Age: 28
LOS: 1 days | Discharge: HOME | End: 2023-05-02
Attending: OBSTETRICS & GYNECOLOGY | Admitting: OBSTETRICS & GYNECOLOGY

## 2023-05-01 PROBLEM — Z3A.38 38 WEEKS GESTATION OF PREGNANCY: Status: ACTIVE | Noted: 2022-10-19

## 2023-05-01 LAB
ABO GROUP BLD: NORMAL
BASE EXCESS BLDCOA CALC-SCNC: -0.2 MMOL/L (ref 3–11)
BASE EXCESS BLDCOV CALC-SCNC: -2.3 MMOL/L (ref 1–9)
BLD GP AB SCN SERPL QL: NEGATIVE
ERYTHROCYTE [DISTWIDTH] IN BLOOD BY AUTOMATED COUNT: 13.6 % (ref 11.6–15.1)
HCO3 BLDCOA-SCNC: 23.4 MMOL/L (ref 17.3–27.3)
HCO3 BLDCOV-SCNC: 20.6 MMOL/L (ref 12.2–28.6)
HCT VFR BLD AUTO: 38.1 % (ref 34.8–46.1)
HGB BLD-MCNC: 12.9 G/DL (ref 11.5–15.4)
HOLD SPECIMEN: NORMAL
MCH RBC QN AUTO: 30.4 PG (ref 26.8–34.3)
MCHC RBC AUTO-ENTMCNC: 33.9 G/DL (ref 31.4–37.4)
MCV RBC AUTO: 90 FL (ref 82–98)
O2 CT VFR BLDCOA CALC: 17.6 ML/DL
OXYHGB MFR BLDCOA: 79.6 %
OXYHGB MFR BLDCOV: 84.8 %
PCO2 BLDCOA: 35.4 MM[HG] (ref 30–60)
PCO2 BLDCOV: 31.1 MM HG (ref 27–43)
PH BLDCOA: 7.44 [PH] (ref 7.23–7.43)
PH BLDCOV: 7.44 [PH] (ref 7.19–7.49)
PLATELET # BLD AUTO: 164 THOUSANDS/UL (ref 149–390)
PMV BLD AUTO: 9.5 FL (ref 8.9–12.7)
PO2 BLDCOA: 34.4 MM HG (ref 5–25)
PO2 BLDCOV: 39.5 MM HG (ref 15–45)
RBC # BLD AUTO: 4.25 MILLION/UL (ref 3.81–5.12)
RH BLD: POSITIVE
SAO2 % BLDCOV: 19.2 ML/DL
SPECIMEN EXPIRATION DATE: NORMAL
TREPONEMA PALLIDUM IGG+IGM AB [PRESENCE] IN SERUM OR PLASMA BY IMMUNOASSAY: NORMAL
WBC # BLD AUTO: 10.48 THOUSAND/UL (ref 4.31–10.16)

## 2023-05-01 PROCEDURE — 4A1HXCZ MONITORING OF PRODUCTS OF CONCEPTION, CARDIAC RATE, EXTERNAL APPROACH: ICD-10-PCS | Performed by: OBSTETRICS & GYNECOLOGY

## 2023-05-01 PROCEDURE — 0HQ9XZZ REPAIR PERINEUM SKIN, EXTERNAL APPROACH: ICD-10-PCS | Performed by: OBSTETRICS & GYNECOLOGY

## 2023-05-01 PROCEDURE — 10907ZC DRAINAGE OF AMNIOTIC FLUID, THERAPEUTIC FROM PRODUCTS OF CONCEPTION, VIA NATURAL OR ARTIFICIAL OPENING: ICD-10-PCS | Performed by: OBSTETRICS & GYNECOLOGY

## 2023-05-01 RX ORDER — BUPIVACAINE HYDROCHLORIDE 2.5 MG/ML
30 INJECTION, SOLUTION EPIDURAL; INFILTRATION; INTRACAUDAL ONCE AS NEEDED
Status: DISCONTINUED | OUTPATIENT
Start: 2023-05-01 | End: 2023-05-01

## 2023-05-01 RX ORDER — ACETAMINOPHEN 325 MG/1
650 TABLET ORAL EVERY 4 HOURS PRN
Status: DISCONTINUED | OUTPATIENT
Start: 2023-05-01 | End: 2023-05-02 | Stop reason: HOSPADM

## 2023-05-01 RX ORDER — OXYTOCIN/RINGER'S LACTATE 30/500 ML
250 PLASTIC BAG, INJECTION (ML) INTRAVENOUS ONCE
Status: COMPLETED | OUTPATIENT
Start: 2023-05-01 | End: 2023-05-01

## 2023-05-01 RX ORDER — SIMETHICONE 80 MG
80 TABLET,CHEWABLE ORAL 4 TIMES DAILY PRN
Status: DISCONTINUED | OUTPATIENT
Start: 2023-05-01 | End: 2023-05-02 | Stop reason: HOSPADM

## 2023-05-01 RX ORDER — IBUPROFEN 600 MG/1
600 TABLET ORAL EVERY 6 HOURS
Status: DISCONTINUED | OUTPATIENT
Start: 2023-05-01 | End: 2023-05-02 | Stop reason: HOSPADM

## 2023-05-01 RX ORDER — DIAPER,BRIEF,INFANT-TODD,DISP
1 EACH MISCELLANEOUS DAILY PRN
Status: DISCONTINUED | OUTPATIENT
Start: 2023-05-01 | End: 2023-05-02 | Stop reason: HOSPADM

## 2023-05-01 RX ORDER — OXYTOCIN/RINGER'S LACTATE 30/500 ML
PLASTIC BAG, INJECTION (ML) INTRAVENOUS
Status: COMPLETED
Start: 2023-05-01 | End: 2023-05-01

## 2023-05-01 RX ORDER — SODIUM CHLORIDE, SODIUM LACTATE, POTASSIUM CHLORIDE, CALCIUM CHLORIDE 600; 310; 30; 20 MG/100ML; MG/100ML; MG/100ML; MG/100ML
125 INJECTION, SOLUTION INTRAVENOUS CONTINUOUS
Status: DISCONTINUED | OUTPATIENT
Start: 2023-05-01 | End: 2023-05-01

## 2023-05-01 RX ADMIN — ACETAMINOPHEN 325MG 650 MG: 325 TABLET ORAL at 08:21

## 2023-05-01 RX ADMIN — BENZOCAINE AND LEVOMENTHOL 1 APPLICATION.: 200; 5 SPRAY TOPICAL at 05:54

## 2023-05-01 RX ADMIN — IBUPROFEN 600 MG: 600 TABLET, FILM COATED ORAL at 11:38

## 2023-05-01 RX ADMIN — SODIUM CHLORIDE, SODIUM LACTATE, POTASSIUM CHLORIDE, AND CALCIUM CHLORIDE 125 ML/HR: .6; .31; .03; .02 INJECTION, SOLUTION INTRAVENOUS at 01:15

## 2023-05-01 RX ADMIN — IBUPROFEN 600 MG: 600 TABLET, FILM COATED ORAL at 04:53

## 2023-05-01 RX ADMIN — HYDROCORTISONE 1 APPLICATION.: 1 CREAM TOPICAL at 05:54

## 2023-05-01 RX ADMIN — Medication 250 MILLI-UNITS/MIN: at 04:28

## 2023-05-01 RX ADMIN — IBUPROFEN 600 MG: 600 TABLET, FILM COATED ORAL at 17:24

## 2023-05-01 RX ADMIN — IBUPROFEN 600 MG: 600 TABLET, FILM COATED ORAL at 23:15

## 2023-05-01 RX ADMIN — WITCH HAZEL 1 PAD.: 500 SOLUTION RECTAL; TOPICAL at 05:54

## 2023-05-01 NOTE — PLAN OF CARE
Problem: PAIN - ADULT  Goal: Verbalizes/displays adequate comfort level or baseline comfort level  Description: Interventions:  - Encourage patient to monitor pain and request assistance  - Assess pain using appropriate pain scale  - Administer analgesics based on type and severity of pain and evaluate response  - Implement non-pharmacological measures as appropriate and evaluate response  - Consider cultural and social influences on pain and pain management  - Notify physician/advanced practitioner if interventions unsuccessful or patient reports new pain  5/1/2023 0445 by Bettie Manzano RN  Outcome: Progressing  5/1/2023 0122 by Bettie Manzano RN  Outcome: Progressing     Problem: INFECTION - ADULT  Goal: Absence or prevention of progression during hospitalization  Description: INTERVENTIONS:  - Assess and monitor for signs and symptoms of infection  - Monitor lab/diagnostic results  - Monitor all insertion sites, i e  indwelling lines, tubes, and drains  - Monitor endotracheal if appropriate and nasal secretions for changes in amount and color  - [place] appropriate cooling/warming therapies per order  - Administer medications as ordered  - Instruct and encourage patient and family to use good hand hygiene technique  - Identify and instruct in appropriate isolation precautions for identified infection/condition  5/1/2023 0445 by Bettie Manzano RN  Outcome: Progressing  5/1/2023 0122 by Bettie Manzano RN  Outcome: Progressing  Goal: Absence of fever/infection during neutropenic period  Description: INTERVENTIONS:  - Monitor WBC    5/1/2023 0445 by Bettie Manzano RN  Outcome: Progressing  5/1/2023 0122 by Betite Manzano RN  Outcome: Progressing     Problem: SAFETY ADULT  Goal: Patient will remain free of falls  Description: INTERVENTIONS:  - Educate patient/family on patient safety including physical limitations  - Instruct patient to call for assistance with activity   - Consult OT/PT to assist with strengthening/mobility   - Keep Call bell within reach  - Keep bed low and locked with side rails adjusted as appropriate  - Keep care items and personal belongings within reach  - Initiate and maintain comfort rounds  - Make Fall Risk Sign visible to staff  Problem: BIRTH - VAGINAL/ SECTION  Goal: Fetal and maternal status remain reassuring during the birth process  Description: INTERVENTIONS:  - Monitor vital signs  - Monitor fetal heart rate  - Monitor uterine activity  - Monitor labor progression (vaginal delivery)  - DVT prophylaxis  - Antibiotic prophylaxis  2023 by Alec Calixto RN  Outcome: Completed  2023 by Alec Calixto RN  Outcome: Progressing  Goal: Emotionally satisfying birthing experience for mother/fetus  Description: Interventions:  - Assess, plan, implement and evaluate the nursing care given to the patient in labor  - Advocate the philosophy that each childbirth experience is a unique experience and support the family's chosen level of involvement and control during the labor process   - Actively participate in both the patient's and family's teaching of the birth process  - Consider cultural, Mu-ism and age-specific factors and plan care for the patient in labor  2023 by Alec Calixto RN  Outcome: Completed  2023 by Alec Calixto RN  Outcome: Progressing     - Apply yellow socks and bracelet for high fall risk patients  - Consider moving patient to room near nurses station  2023 by Alec Calixto RN  Outcome: Progressing  2023 by Alec Calixto RN  Outcome: Progressing  Goal: Maintain or return to baseline ADL function  Description: INTERVENTIONS:  -  Assess patient's ability to carry out ADLs; assess patient's baseline for ADL function and identify physical deficits which impact ability to perform ADLs (bathing, care of mouth/teeth, toileting, grooming, dressing, etc )  - Assess/evaluate cause of self-care deficits   - Assess range of motion  - Assess patient's mobility; develop plan if impaired  - Assess patient's need for assistive devices and provide as appropriate  - Encourage maximum independence but intervene and supervise when necessary  - Involve family in performance of ADLs  - Assess for home care needs following discharge   - Consider OT consult to assist with ADL evaluation and planning for discharge  - Provide patient education as appropriate  5/1/2023 0445 by Tomas Mckeon RN  Outcome: Progressing  5/1/2023 0122 by Tomas Mckeon RN  Outcome: Progressing  Goal: Maintains/Returns to pre admission functional level  Description: INTERVENTIONS:  - Perform BMAT or MOVE assessment daily    - Set and communicate daily mobility goal to care team and patient/family/caregiver  - Collaborate with rehabilitation services on mobility goals if consulted  - Out of bed for toileting  - Record patient progress and toleration of activity level   5/1/2023 0445 by Tomas Mckeon RN  Outcome: Progressing  5/1/2023 0122 by Tomas Mckeon RN  Outcome: Progressing     Problem: Knowledge Deficit  Goal: Patient/family/caregiver demonstrates understanding of disease process, treatment plan, medications, and discharge instructions  Description: Complete learning assessment and assess knowledge base    Interventions:  - Provide teaching at level of understanding  - Provide teaching via preferred learning methods  5/1/2023 0445 by Tomas Mckeon RN  Outcome: Progressing  5/1/2023 0122 by Tomas Mckeon RN  Outcome: Progressing     Problem: DISCHARGE PLANNING  Goal: Discharge to home or other facility with appropriate resources  Description: INTERVENTIONS:  - Identify barriers to discharge w/patient and caregiver  - Arrange for needed discharge resources and transportation as appropriate  - Identify discharge learning needs (meds, wound care, etc )  - Arrange for interpretive services to assist at discharge as needed  - Refer to Case Management Department for coordinating discharge planning if the patient needs post-hospital services based on physician/advanced practitioner order or complex needs related to functional status, cognitive ability, or social support system  2023 0445 by Lenny Amaro RN  Outcome: Progressing  2023 0122 by Lenny Amaro RN  Outcome: Progressing     Problem: POSTPARTUM  Goal: Experiences normal postpartum course  Description: INTERVENTIONS:  - Monitor maternal vital signs  - Assess uterine involution and lochia  Outcome: Progressing  Goal: Appropriate maternal -  bonding  Description: INTERVENTIONS:  - Identify family support  - Assess for appropriate maternal/infant bonding   -Encourage maternal/infant bonding opportunities  - Referral to  or  as needed  Outcome: Progressing  Goal: Establishment of infant feeding pattern  Description: INTERVENTIONS:  - Assess breast/bottle feeding  - Refer to lactation as needed  Outcome: Progressing  Goal: Incision(s), wounds(s) or drain site(s) healing without S/S of infection  Description: INTERVENTIONS  - Assess and document dressing, incision, wound bed, drain sites and surrounding tissue  - Provide patient and family education  Outcome: Progressing

## 2023-05-01 NOTE — OB LABOR/OXYTOCIN SAFETY PROGRESS
Labor Progress Note - Shayla Doherty 32 y o  female MRN: [id number]    Unit/Bed#: -01 Encounter: [id number]                 Cervical Dilation: 8        Cervical Effacement: 80  Fetal Station: -1   Category 1  Fetal Heart Rate: 135 BPM                  Vital Signs:   Vitals:    05/01/23 0048   BP: 132/88   Pulse: 100   Resp: 20   Temp: 98 3 °F (36 8 °C)   SpO2: 97%       Notes/comments:     AROM for clear fluid @ 0141   Anticipate SAVD shortly    Liya Cabrera MD 5/1/2023 1:45 AM

## 2023-05-01 NOTE — OB LABOR/OXYTOCIN SAFETY PROGRESS
Labor Progress Note - Reina Skiff 32 y o  female MRN: [id number]    Unit/Bed#: -01 Encounter: [id number]       Contraction Frequency (minutes): 3-5  Contraction Quality: Strong  Tachysystole: No   Cervical Dilation: 9        Cervical Effacement: 90  Fetal Station: -1  Baseline Rate: 145 bpm  Fetal Heart Rate: 125 BPM  FHR Category: Category I               Vital Signs:   Vitals:    05/01/23 0338   BP:    Pulse:    Resp:    Temp: 98 6 °F (37 °C)   SpO2:        Notes/comments: Feeling some pressure with contractions  Anticipate JACE Anne MD 5/1/2023 3:54 AM

## 2023-05-01 NOTE — PLAN OF CARE

## 2023-05-01 NOTE — L&D DELIVERY NOTE
Patient was found to be completely dilated and +2 station  She pushed for 4 minutes to spontaneously deliver a liveborn female infant in OA position through clear fluid at 0405, weight 7# 13 2 oz, APGARS 9 and 9 at 1 and 5 minutes, respectively  The head and shoulders delivered easily, with the body easily delivering thereafter  The infant was placed on maternal abdomen  Cord clamping was delayed for 30 seconds, after which the cord was doubly clamped and cut  Cord gases were collected  Cord blood was collected  Pitocin was started for active management of the 3rd stage  Placenta delivered spontaneously thereafter  Bimanual exam was performed, and the fundus was noted to be firm  A first degree laceration was noted and repaired with 3-0 Vicryl rapide suture  Excellent hemostasis was noted, with total EBL of 66 mL       Dana Egan MD  5/1/2023 4:24 AM

## 2023-05-01 NOTE — PLAN OF CARE
Problem: BIRTH - VAGINAL/ SECTION  Goal: Fetal and maternal status remain reassuring during the birth process  Description: INTERVENTIONS:  - Monitor vital signs  - Monitor fetal heart rate  - Monitor uterine activity  - Monitor labor progression (vaginal delivery)  - DVT prophylaxis  - Antibiotic prophylaxis  Outcome: Progressing  Goal: Emotionally satisfying birthing experience for mother/fetus  Description: Interventions:  - Assess, plan, implement and evaluate the nursing care given to the patient in labor  - Advocate the philosophy that each childbirth experience is a unique experience and support the family's chosen level of involvement and control during the labor process   - Actively participate in both the patient's and family's teaching of the birth process  - Consider cultural, Christian and age-specific factors and plan care for the patient in labor  Outcome: Progressing     Problem: PAIN - ADULT  Goal: Verbalizes/displays adequate comfort level or baseline comfort level  Description: Interventions:  - Encourage patient to monitor pain and request assistance  - Assess pain using appropriate pain scale  - Administer analgesics based on type and severity of pain and evaluate response  - Implement non-pharmacological measures as appropriate and evaluate response  - Consider cultural and social influences on pain and pain management  - Notify physician/advanced practitioner if interventions unsuccessful or patient reports new pain  Outcome: Progressing     Problem: INFECTION - ADULT  Goal: Absence or prevention of progression during hospitalization  Description: INTERVENTIONS:  - Assess and monitor for signs and symptoms of infection  - Monitor lab/diagnostic results  - Monitor all insertion sites, i e  indwelling lines, tubes, and drains  - Monitor endotracheal if appropriate and nasal secretions for changes in amount and color  - [place] appropriate cooling/warming therapies per order  - Administer medications as ordered  - Instruct and encourage patient and family to use good hand hygiene technique  - Identify and instruct in appropriate isolation precautions for identified infection/condition  Outcome: Progressing  Goal: Absence of fever/infection during neutropenic period  Description: INTERVENTIONS:  - Monitor WBC    Outcome: Progressing     Problem: SAFETY ADULT  Goal: Patient will remain free of falls  Description: INTERVENTIONS:  - Educate patient/family on patient safety including physical limitations  - Instruct patient to call for assistance with activity   - Consult OT/PT to assist with strengthening/mobility   - Keep Call bell within reach  - Keep bed low and locked with side rails adjusted as appropriate  - Keep care items and personal belongings within reach  - Initiate and maintain comfort rounds  - Make Fall Risk Sign visible to staff  - Apply yellow socks and bracelet for high fall risk patients  - Consider moving patient to room near nurses station  Outcome: Progressing  Goal: Maintain or return to baseline ADL function  Description: INTERVENTIONS:  -  Assess patient's ability to carry out ADLs; assess patient's baseline for ADL function and identify physical deficits which impact ability to perform ADLs (bathing, care of mouth/teeth, toileting, grooming, dressing, etc )  - Assess/evaluate cause of self-care deficits   - Assess range of motion  - Assess patient's mobility; develop plan if impaired  - Assess patient's need for assistive devices and provide as appropriate  - Encourage maximum independence but intervene and supervise when necessary  - Involve family in performance of ADLs  - Assess for home care needs following discharge   - Consider OT consult to assist with ADL evaluation and planning for discharge  - Provide patient education as appropriate  Outcome: Progressing  Goal: Maintains/Returns to pre admission functional level  Description: INTERVENTIONS:  - Perform BMAT or MOVE assessment daily    - Set and communicate daily mobility goal to care team and patient/family/caregiver  - Collaborate with rehabilitation services on mobility goals if consulted  - Out of bed for toileting  - Record patient progress and toleration of activity level   Outcome: Progressing     Problem: Knowledge Deficit  Goal: Patient/family/caregiver demonstrates understanding of disease process, treatment plan, medications, and discharge instructions  Description: Complete learning assessment and assess knowledge base    Interventions:  - Provide teaching at level of understanding  - Provide teaching via preferred learning methods  Outcome: Progressing     Problem: DISCHARGE PLANNING  Goal: Discharge to home or other facility with appropriate resources  Description: INTERVENTIONS:  - Identify barriers to discharge w/patient and caregiver  - Arrange for needed discharge resources and transportation as appropriate  - Identify discharge learning needs (meds, wound care, etc )  - Arrange for interpretive services to assist at discharge as needed  - Refer to Case Management Department for coordinating discharge planning if the patient needs post-hospital services based on physician/advanced practitioner order or complex needs related to functional status, cognitive ability, or social support system  Outcome: Progressing

## 2023-05-01 NOTE — LACTATION NOTE
"This note was copied from a baby's chart  Met with mother to discuss feeding plan  Mother is breastfeeding and discussed that baby has been latching well for her with no discomfort  The Ready, Set, Baby Booklet was discussed  Discussed importance of skin to skin to help baby awaken for breastfeeding, to help with milk production as well as stabilize temperature, blood sugars, decrease pain, promote relaxation, and calm the baby as well as for bonding that father may do as well  Showed images of tummy size progression as milk production increases to meet the nutritional/growing needs of the baby and risks associated with introducing early supplementation that is not medically indicated  Discussed alternative feeding methods as a manner to provide baby with additional colostrum/breast milk if baby is sleepy and/or unable to breastfeed directly to help protect the milk supply and preserve latching abilities at the breast  Mother was encouraged to hand express after feedings to provide \"dessert\" and when sleepy to hand express to provide \"snacks\" which could help baby to awaken for a feeding  Discussed Second Night Syndrome explaining how babys cluster feeds to meet growing needs  Growth spurts periods were discussed within the first year and how cluster feeding helps boost milk supply  Explained feeding cues and fullness cues as well as importance of obtaining a deep latch for effective milk removal and proper positioning (tummy to tummy, at level, nose to nipple, bring chin to breast first and bringing baby to breast) with ear, shoulder, and hip alignment  Addressed breast pump needs and mother discussed that she has a breast pump for home use  Mother was made aware of how to communicate with lactation and encouraged to reach out for a latch assessment, continued support and/or questions that arise      "

## 2023-05-01 NOTE — H&P
H&P Exam - Obstetrics   Sedrick Virk 32 y o  female MRN: [id number]  Unit/Bed#: -01 Encounter: [id number]    Assessment/Plan     Assessment:  Spontaneous labor @ 38w 4d  Plan:  Admit  IVF  Will AROM and anticipate SAVD     History of Present Illness   Chief Complaint: Active labor  HPI:  Sedrick Virk is a 32 y o   female with an SANDRO of 2023, by Last Menstrual Period at 38w4d weeks gestation who is being admitted for Active labor  Her current obstetrical history is significant for rubella non immune  Contractions: Date/time of onset: 23 @ 2315, Frequency: Every 5 minutes and Intensity: moderate  Leakage of fluid: None  Bleeding: None  Fetal movement: present  Pregnancy complications: none  Review of Systems    Historical Information   OB History    Para Term  AB Living   3 2 1 1   2   SAB IAB Ectopic Multiple Live Births           2      # Outcome Date GA Lbr Maurizio/2nd Weight Sex Delivery Anes PTL Lv   3 Current            2 Term 10/30/20 38w5d  3204 g (7 lb 1 oz) F Vag-Spont None N EDWIN      Complications: Precipitous delivery   1  19 36w3d  2693 g (5 lb 15 oz) M Vag-Spont  N EDWIN      Complications: Failure to Progress in Second Stage     Baby complications/comments:   No past medical history on file    Past Surgical History:   Procedure Laterality Date    WISDOM TOOTH EXTRACTION       Social History   Social History     Substance and Sexual Activity   Alcohol Use Not Currently     Social History     Substance and Sexual Activity   Drug Use Never     Social History     Tobacco Use   Smoking Status Never    Passive exposure: Never   Smokeless Tobacco Never     E-Cigarette/Vaping    E-Cigarette Use Never User      E-Cigarette/Vaping Substances    Nicotine No     THC No     CBD No     Flavoring No     Other No     Unknown No      Family History: non-contributory    Meds/Allergies   all medications and allergies reviewed  No Known "Allergies    Objective   Vitals: Blood pressure 132/88, pulse 100, temperature 98 3 °F (36 8 °C), temperature source Oral, resp  rate 20, height 5' 4 75\" (1 645 m), weight 81 7 kg (180 lb 3 2 oz), last menstrual period 08/04/2022, SpO2 97 %, not currently breastfeeding  Body mass index is 30 22 kg/m²  Invasive Devices     None                 Physical Exam  Vitals and nursing note reviewed  Constitutional:       General: She is not in acute distress  Appearance: She is well-developed  HENT:      Head: Normocephalic and atraumatic  Eyes:      Conjunctiva/sclera: Conjunctivae normal    Cardiovascular:      Rate and Rhythm: Normal rate and regular rhythm  Heart sounds: No murmur heard  Pulmonary:      Effort: Pulmonary effort is normal  No respiratory distress  Breath sounds: Normal breath sounds  Abdominal:      Palpations: Abdomen is soft  Tenderness: There is no abdominal tenderness  Comments: EFW 7#   Musculoskeletal:         General: No swelling  Cervical back: Neck supple  Skin:     General: Skin is warm and dry  Capillary Refill: Capillary refill takes less than 2 seconds  Neurological:      Mental Status: She is alert  Psychiatric:         Mood and Affect: Mood normal          Prenatal Labs: I have personally reviewed pertinent reports  Imaging, EKG, Pathology, and Other Studies: I have personally reviewed pertinent reports                 "

## 2023-05-02 ENCOUNTER — TELEPHONE (OUTPATIENT)
Dept: LABOR AND DELIVERY | Facility: HOSPITAL | Age: 28
End: 2023-05-02

## 2023-05-02 VITALS
TEMPERATURE: 97.2 F | SYSTOLIC BLOOD PRESSURE: 106 MMHG | WEIGHT: 180.2 LBS | OXYGEN SATURATION: 96 % | HEART RATE: 74 BPM | RESPIRATION RATE: 17 BRPM | DIASTOLIC BLOOD PRESSURE: 77 MMHG | BODY MASS INDEX: 30.02 KG/M2 | HEIGHT: 65 IN

## 2023-05-02 LAB
ERYTHROCYTE [DISTWIDTH] IN BLOOD BY AUTOMATED COUNT: 13.8 % (ref 11.6–15.1)
HCT VFR BLD AUTO: 37.1 % (ref 34.8–46.1)
HGB BLD-MCNC: 12.2 G/DL (ref 11.5–15.4)
MCH RBC QN AUTO: 30.4 PG (ref 26.8–34.3)
MCHC RBC AUTO-ENTMCNC: 32.9 G/DL (ref 31.4–37.4)
MCV RBC AUTO: 93 FL (ref 82–98)
PLATELET # BLD AUTO: 165 THOUSANDS/UL (ref 149–390)
PMV BLD AUTO: 9.2 FL (ref 8.9–12.7)
RBC # BLD AUTO: 4.01 MILLION/UL (ref 3.81–5.12)
WBC # BLD AUTO: 10.92 THOUSAND/UL (ref 4.31–10.16)

## 2023-05-02 RX ORDER — ACETAMINOPHEN 325 MG/1
650 TABLET ORAL EVERY 4 HOURS PRN
Refills: 0
Start: 2023-05-02

## 2023-05-02 RX ORDER — IBUPROFEN 200 MG
600 TABLET ORAL EVERY 6 HOURS
Start: 2023-05-02

## 2023-05-02 RX ADMIN — MEASLES, MUMPS, AND RUBELLA VIRUS VACCINE LIVE 0.5 ML: 1000; 12500; 1000 INJECTION, POWDER, LYOPHILIZED, FOR SUSPENSION SUBCUTANEOUS at 12:11

## 2023-05-02 RX ADMIN — IBUPROFEN 600 MG: 600 TABLET, FILM COATED ORAL at 04:58

## 2023-05-02 RX ADMIN — IBUPROFEN 600 MG: 600 TABLET, FILM COATED ORAL at 12:12

## 2023-05-02 NOTE — DISCHARGE SUMMARY
Discharge Summary - OB/GYN   Tg Fry 32 y o  female MRN: [id number]  Unit/Bed#: -01 Encounter: [id number]    Admission Date: 2023     Discharge Date: 23    Principal Diagnosis: Spontaneous labor, [id number] Pregnancy at 38w4d    Secondary Diagnosis: n/a    Attending - Delivery: Angel Lambert MD         - Discharge: Daisy Stubbs MD    Procedures: Vaginal, Spontaneous , 1st degree laceration    Anesthesia: none    Complications: none apparent    Hospital Course:      Tg Fry is a 32 y o  [id number] at 38w4d who was admitted with labor     She delivered a viable  with weight of 3550 grams, apgars of 9 (1 min) and 9 (5 min)   was transferred to  nursery  Patient tolerated the procedure well and was transferred to recovery in stable condition  Her postpartum course was uncomplicated  Admission hemoglobin was 12 9, postpartum was 12 2  On day of discharge, she was ambulating and able to reasonably perform all ADLs  She was voiding and had appropriate bowel function  Pain was well controlled  She was discharged home on postpartum day #1 without complications  Patient was instructed to follow up with her OB as an outpatient and was given appropriate warnings to call provider if she develops signs of infection or uncontrolled pain  Condition at discharge: good     Discharge instructions/Information to patient and family:   See after visit summary for information provided to patient and family  Provisions for Follow-Up Care:  See after visit summary for information related to follow-up care and any pertinent home health orders  Disposition: See After Visit Summary for discharge disposition information  Planned Readmission: No    Discharge Medications: For a complete list of the patient's medications, please refer to her med rec      Daisy Stubbs MD  2023 11:03 AM

## 2023-05-02 NOTE — TELEPHONE ENCOUNTER
Patient is being discharged to home today from [facility] following vaginal delivery on 5/1/2023 with Dr Lubna Rendon  Her hospital course was uncomplicated  She should be scheduled for routine postpartum follow up in office      Alix Reaves MD  5/2/2023 11:06 AM

## 2023-05-02 NOTE — PLAN OF CARE
Problem: POSTPARTUM  Goal: Experiences normal postpartum course  Description: INTERVENTIONS:  - Monitor maternal vital signs  - Assess uterine involution and lochia  Outcome: Progressing  Goal: Appropriate maternal -  bonding  Description: INTERVENTIONS:  - Identify family support  - Assess for appropriate maternal/infant bonding   -Encourage maternal/infant bonding opportunities  - Referral to  or  as needed  Outcome: Progressing  Goal: Establishment of infant feeding pattern  Description: INTERVENTIONS:  - Assess breast/bottle feeding  - Refer to lactation as needed  Outcome: Progressing  Goal: Incision(s), wounds(s) or drain site(s) healing without S/S of infection  Description: INTERVENTIONS  - Assess and document dressing, incision, wound bed, drain sites and surrounding tissue  - Provide patient and family education  - Perform skin care/dressing changes every   Outcome: Progressing     Problem: PAIN - ADULT  Goal: Verbalizes/displays adequate comfort level or baseline comfort level  Description: Interventions:  - Encourage patient to monitor pain and request assistance  - Assess pain using appropriate pain scale  - Administer analgesics based on type and severity of pain and evaluate response  - Implement non-pharmacological measures as appropriate and evaluate response  - Consider cultural and social influences on pain and pain management  - Notify physician/advanced practitioner if interventions unsuccessful or patient reports new pain  Outcome: Progressing     Problem: INFECTION - ADULT  Goal: Absence or prevention of progression during hospitalization  Description: INTERVENTIONS:  - Assess and monitor for signs and symptoms of infection  - Monitor lab/diagnostic results  - Monitor all insertion sites, i e  indwelling lines, tubes, and drains  - Monitor endotracheal if appropriate and nasal secretions for changes in amount and color  - [place] appropriate cooling/warming therapies per order  - Administer medications as ordered  - Instruct and encourage patient and family to use good hand hygiene technique  - Identify and instruct in appropriate isolation precautions for identified infection/condition  Outcome: Progressing  Goal: Absence of fever/infection during neutropenic period  Description: INTERVENTIONS:  - Monitor WBC    Outcome: Progressing     Problem: SAFETY ADULT  Goal: Patient will remain free of falls  Description: INTERVENTIONS:  - Educate patient/family on patient safety including physical limitations  - Instruct patient to call for assistance with activity   - Consult OT/PT to assist with strengthening/mobility   - Keep Call bell within reach  - Keep bed low and locked with side rails adjusted as appropriate  - Keep care items and personal belongings within reach  - Initiate and maintain comfort rounds  - Make Fall Risk Sign visible to staff  - Offer Toileting every  Hours, in advance of need  - Initiate/Maintain alarm  - Obtain necessary fall risk management equipment:   - Apply yellow socks and bracelet for high fall risk patients  - Consider moving patient to room near nurses station  Outcome: Progressing  Goal: Maintain or return to baseline ADL function  Description: INTERVENTIONS:  -  Assess patient's ability to carry out ADLs; assess patient's baseline for ADL function and identify physical deficits which impact ability to perform ADLs (bathing, care of mouth/teeth, toileting, grooming, dressing, etc )  - Assess/evaluate cause of self-care deficits   - Assess range of motion  - Assess patient's mobility; develop plan if impaired  - Assess patient's need for assistive devices and provide as appropriate  - Encourage maximum independence but intervene and supervise when necessary  - Involve family in performance of ADLs  - Assess for home care needs following discharge   - Consider OT consult to assist with ADL evaluation and planning for discharge  - Provide patient education as appropriate  Outcome: Progressing  Goal: Maintains/Returns to pre admission functional level  Description: INTERVENTIONS:  - Perform BMAT or MOVE assessment daily    - Set and communicate daily mobility goal to care team and patient/family/caregiver  - Collaborate with rehabilitation services on mobility goals if consulted  - Perform Range of Motion  times a day  - Reposition patient every  hours  - Dangle patient  times a day  - Stand patient  times a day  - Ambulate patient  times a day  - Out of bed to chair  times a day   - Out of bed for meals  times a day  - Out of bed for toileting  - Record patient progress and toleration of activity level   Outcome: Progressing     Problem: Knowledge Deficit  Goal: Patient/family/caregiver demonstrates understanding of disease process, treatment plan, medications, and discharge instructions  Description: Complete learning assessment and assess knowledge base    Interventions:  - Provide teaching at level of understanding  - Provide teaching via preferred learning methods  Outcome: Progressing     Problem: DISCHARGE PLANNING  Goal: Discharge to home or other facility with appropriate resources  Description: INTERVENTIONS:  - Identify barriers to discharge w/patient and caregiver  - Arrange for needed discharge resources and transportation as appropriate  - Identify discharge learning needs (meds, wound care, etc )  - Arrange for interpretive services to assist at discharge as needed  - Refer to Case Management Department for coordinating discharge planning if the patient needs post-hospital services based on physician/advanced practitioner order or complex needs related to functional status, cognitive ability, or social support system  Outcome: Progressing

## 2023-05-02 NOTE — PROGRESS NOTES
Progress Note - OB/GYN  Post-Partum Physician Note   Elio Poe 32 y o  female MRN: [id number]  Unit/Bed#:   Encounter: [id number]    Assessment:  32y o  year-old , postpartum day #1 s/p     Plan:   Continue routine postpartum care  Encourage ambulation  Patient clinically stable for discharge to home    Rubella non-immune status, antepartum  Will need MMR post partum      _________________________________    Subjective:   Pain: Well controlled  Tolerating Oral Intake: Yes  Voiding: Yes  Ambulating: Yes  Breastfeeding: Yes  Chest Pain: No  Shortness of Breath: No  Leg Pain/Discomfort: No  Lochia: Normal    Objective:   Vitals:    23 1618 23 1900 23 2315 23 0723   BP: 110/64 114/84 117/89 106/77   BP Location: Right arm Right arm Right arm Left arm   Pulse: 75 84 63 74   Resp: 18 18 18 17   Temp: 97 8 °F (36 6 °C) 98 1 °F (36 7 °C) 98 7 °F (37 1 °C) (!) 97 2 °F (36 2 °C)   TempSrc: Oral Oral Oral Temporal   SpO2: 97% 98% 99% 96%   Weight:       Height:           Intake/Output Summary (Last 24 hours) at 2023 1100  Last data filed at 2023 1157  Gross per 24 hour   Intake --   Output 600 ml   Net -600 ml       Physical Exam:  General: in no apparent distress, alert, oriented times 3 and afebrile  Abdomen: abdomen is soft without significant tenderness, masses, organomegaly or guarding  Fundus: Firm and non-tender, 2 below the umbilicus  Lower extremeties: nontender    Labs/Tests:   Lab Results   Component Value Date/Time    HGB 12 2 2023 05:59 AM    HGB 12 9 2023 01:07 AM     2023 05:59 AM     2023 01:07 AM    WBC 10 92 (H) 2023 05:59 AM    WBC 10 48 (H) 2023 01:07 AM        Brief OB Lab review:  ABO Grouping   Date Value Ref Range Status   2023 O  Final      Rh Factor   Date Value Ref Range Status   2023 Positive  Final     Rh Type   Date Value Ref Range Status   2022 Positive  Final Comment:     Please note: Prior records for this patient's ABO / Rh type are not  available for additional verification  No results found for: ANTIBODYSCR  No results found for: RUBM    MEDS:   Current Facility-Administered Medications   Medication Dose Route Frequency    acetaminophen (TYLENOL) tablet 650 mg  650 mg Oral Q4H PRN    benzocaine-menthol-lanolin-aloe (DERMOPLAST) 20-0 5 % topical spray 1 application  1 application  Topical Q6H PRN    hydrocortisone 1 % cream 1 application  1 application  Topical Daily PRN    ibuprofen (MOTRIN) tablet 600 mg  600 mg Oral Q6H    measles-mumps-rubella (M-M-R II) subcutaneous injection 0 5 mL  0 5 mL Subcutaneous Once    simethicone (MYLICON) chewable tablet 80 mg  80 mg Oral 4x Daily PRN    witch hazel-glycerin (TUCKS) topical pad 1 pad  1 pad   Topical Q4H PRN     Invasive Devices     None                   William Mix MD  5/2/2023 11:00 AM

## 2023-05-03 NOTE — UTILIZATION REVIEW
"    BOTH MOTHER AND BABY DISCHARGED MAY 2    NOTIFICATION OF INPATIENT ADMISSION   MATERNITY/DELIVERY AUTHORIZATION REQUEST   SERVICING FACILITY:   [facility] - L&D, , NICU  [address]  , [facility], [address]  Tax ID: [id number]  NPI: [id number] ATTENDING PROVIDER:  Attending Name and NPI#: Brayan Pabon [[id number]]  Address: [address] , [facility], [address] [place] [address] Road  Phone: [phone]     ADMISSION INFORMATION:  Place of Service: Inpatient [address] [facility] Code: 21  Inpatient Admission Date/Time: 23 12:59 AM  Discharge Date/Time: 2023 12:00 PM  Admitting Diagnosis Code/Description:  Abdominal pain in pregnancy, third trimester [O26 893, R10 9]     Mother: Teddy Mackey 1995 Estimated Date of Delivery: 23  Delivering clinician: Ysabel King    OB History        3    Para   3    Term   2       1    AB        Living   3       SAB        IAB        Ectopic        Multiple   0    Live Births   3                Name & MRN:   Information for the patient's :  Carenmattlester Faisal Girl Sheri Murry) [[id number]]      Delivery Information:  Sex: female  Delivered 2023 4:06 AM by Vaginal, Spontaneous; Gestational Age: 38w3d    [place] Measurements:  Weight: 7 lb 13 2 oz (3550 g); Height: 19 5\"    APGAR 1 minute 5 minutes 10 minutes   Totals: 9 9      [place] Birth Information: 32 y o  female MRN: [id number] Unit/Bed#: -01   Birthweight: No birth weight on file  Gestational Age: <None> Delivery Type:    APGARS Totals:        UTILIZATION REVIEW CONTACT:  Nico Ramsey Utilization   Network Utilization Review Department  Phone: [phone]  Fax [phone]  Email: [email or web address]  Contact for approvals/pending authorizations, clinical reviews, and discharge       PHYSICIAN ADVISORY SERVICES:  Medical Necessity Denial & Mpoi-pa-Fnzy Review  Phone: " [phone]  Fax: [phone]  Email: [email or web address]

## 2023-06-13 ENCOUNTER — POSTPARTUM VISIT (OUTPATIENT)
Dept: OBGYN CLINIC | Facility: CLINIC | Age: 28
End: 2023-06-13
Payer: COMMERCIAL

## 2023-06-13 VITALS
BODY MASS INDEX: 28.82 KG/M2 | SYSTOLIC BLOOD PRESSURE: 126 MMHG | WEIGHT: 173 LBS | HEIGHT: 65 IN | DIASTOLIC BLOOD PRESSURE: 88 MMHG

## 2023-06-13 NOTE — PROGRESS NOTES
"[address], [address], [facility], [address] [facility] [address]    Assessment/Plan:  Jackie Amin is a 25y o  year old  who presents for postpartum visit  Routine Postpartum Care  1  Normal postpartum exam  2  Contraception: condoms  3  Depression Screen: negative screen  4  Feeding: breast  5  Psychosocial support: good  6  Patient Education: \"Fourth Trimester Project: [employer]  com\"  7  Cervical cancer screening Up to Date, next due   8  Follow up in: 3 months or as needed  Additional Problems:  1  Routine postpartum follow-up          Subjective:     CC: Postpartum visit    Gricelda Palma is a 25 y o  y o  female [id number] who presents for a postpartum visit  She is 6 weeks postpartum following a spontaneous vaginal delivery on 23 at 38 4 weeks  Outcome: spontaneous vaginal delivery  Anesthesia: none  Postpartum course has been unremarkable  Baby's course has been unremarkable  Baby is feeding by breast      Bleeding no bleeding  Bowel function is normal  Bladder function is normal  Patient is not sexually active  Contraception method is condoms  Postpartum depression screening: negative      The following portions of the patient's history were reviewed and updated as appropriate: allergies, current medications, past family history, past medical history, obstetric history, gynecologic history, past social history, past surgical history and problem list       Objective:  /88 (BP Location: Right arm, Patient Position: Sitting, Cuff Size: Standard)   Ht 5' 4 75\" (1 645 m)   Wt 78 5 kg (173 lb)   LMP 2022 (Exact Date)   Breastfeeding Yes   BMI 29 01 kg/m²   Pregravid Weight/BMI: 60 8 kg (134 lb) (BMI 22 46)  Current Weight: 78 5 kg (173 lb)   Total Weight Gain: 21 kg (46 lb 3 2 oz)   Pre-Leonard Vitals    Flowsheet Row Most Recent Value   Prenatal Assessment    Prenatal Vitals    Blood Pressure 126/88   Weight - Scale 78 5 kg (173 lb)   Urine " Albumin/Glucose    Dilation/Effacement/Station    Vaginal Drainage    Edema            General: Well appearing, no distress  Mood and affect: Appropriate  Abdomen: Soft, nontender  Thyroid: No masses  Incision: n/a  Vulva: Well healed  Vagina: Well healed  No lesions  Urethra: Normal  Cervix: Healed, no lesions  Uterus: Normal size, no masses  Adnexa: No pain or masses  Extremities: Warm and well perfused  Non tender

## 2023-08-23 ENCOUNTER — TELEPHONE (OUTPATIENT)
Dept: FAMILY MEDICINE CLINIC | Facility: CLINIC | Age: 28
End: 2023-08-23

## 2023-08-23 NOTE — TELEPHONE ENCOUNTER
Patient is aware you are not in today. Patient needs a TB test for work. Is this okay to schedule? Last cp was 3/28/23.

## 2023-08-24 NOTE — TELEPHONE ENCOUNTER
Called and lvm to scheduled PPD. Okay to schedule any day next week except Monday per Yarsani point.

## 2023-08-29 ENCOUNTER — CLINICAL SUPPORT (OUTPATIENT)
Dept: FAMILY MEDICINE CLINIC | Facility: CLINIC | Age: 28
End: 2023-08-29
Payer: COMMERCIAL

## 2023-08-29 DIAGNOSIS — Z11.1 ENCOUNTER FOR PPD TEST: Primary | ICD-10-CM

## 2023-08-29 PROCEDURE — 86580 TB INTRADERMAL TEST: CPT

## 2023-08-31 ENCOUNTER — CLINICAL SUPPORT (OUTPATIENT)
Dept: FAMILY MEDICINE CLINIC | Facility: CLINIC | Age: 28
End: 2023-08-31

## 2023-08-31 DIAGNOSIS — Z11.1 ENCOUNTER FOR PPD SKIN TEST READING: Primary | ICD-10-CM

## 2023-08-31 LAB
INDURATION: 0 MM
TB SKIN TEST: NEGATIVE

## 2023-09-20 ENCOUNTER — ANNUAL EXAM (OUTPATIENT)
Dept: OBGYN CLINIC | Facility: CLINIC | Age: 28
End: 2023-09-20
Payer: COMMERCIAL

## 2023-09-20 VITALS
WEIGHT: 162 LBS | BODY MASS INDEX: 26.99 KG/M2 | DIASTOLIC BLOOD PRESSURE: 70 MMHG | HEIGHT: 65 IN | SYSTOLIC BLOOD PRESSURE: 110 MMHG

## 2023-09-20 DIAGNOSIS — Z01.419 ENCOUNTER FOR ANNUAL ROUTINE GYNECOLOGICAL EXAMINATION: Primary | ICD-10-CM

## 2023-09-20 PROCEDURE — 99395 PREV VISIT EST AGE 18-39: CPT | Performed by: OBSTETRICS & GYNECOLOGY

## 2023-09-20 RX ORDER — UREA 10 %
500 LOTION (ML) TOPICAL 2 TIMES DAILY
COMMUNITY

## 2023-09-20 RX ORDER — OMEGA-3-ACID ETHYL ESTERS 1 G/1
2 CAPSULE, LIQUID FILLED ORAL 2 TIMES DAILY
COMMUNITY

## 2023-09-20 RX ORDER — MELATONIN
1000 DAILY
COMMUNITY

## 2023-09-20 NOTE — LETTER
September 20, 2023     [place], [address]  [address] [facility]    Patient: Gem Watkins   YOB: 1995   Date of Visit: 9/20/2023       Dear Dr. Hunter Ho: Thank you for referring Dex Arriola to me for evaluation. Below are my notes for this consultation. If you have questions, please do not hesitate to call me. I look forward to following your patient along with you. Sincerely,        Quincy Carson MD        CC: No Recipients    Quincy Carson MD  9/20/2023 10:26 AM  Sign when Signing Visit  Annual Wellness Visit  [address]  [address] [facility], [address], [facility], [address] [place] [address]    ASSESSMENT/PLAN: Gem Watkins is a 25 y.o. [id number] who presents for annual gynecologic exam.    Encounter for routine gynecologic examination  - Routine well woman exam completed today. - Cervical Cancer Screening: Current ASCCP Guidelines reviewed. Last Pap: 03/23/2022 normal. Next Pap Due: 1-2 years, routine.  - HPV Vaccination status: Not immunized  - STI screening offered including HIV testing: offered, pt declined  - Contraceptive counseling discussed. Current contraception: condoms,   - The following were reviewed in today's visit: family planning choices, exercise and healthy diet    Additional problems addressed during this visit:  1. Encounter for annual routine gynecological examination        Next visit: 1 year Wellness      CC:  Annual Gynecologic Examination    HPI: Gem Watkins is a 25 y.o. [id number] who presents for annual gynecologic examination. She denies any breast, urinary or pelvic issues at today's visit. Period just returned end of August, still nursing. Condoms for contraception. No new partners. Gyn History  Patient's last menstrual period was 08/29/2023. Last Pap: 03/23/2022 was normal    She  reports being sexually active and has had partner(s) who are male.  She reports using the following method of birth control/protection: Condom Male. OB History  [id number]    No past medical history on file. Past Surgical History:  No date: WISDOM TOOTH EXTRACTION     Family History   Problem Relation Age of Onset   • No Known Problems Mother    • Pancreatic cancer Father 61        Social History     Tobacco Use   • Smoking status: Never     Passive exposure: Never   • Smokeless tobacco: Never   Vaping Use   • Vaping Use: Never used   Substance Use Topics   • Alcohol use: Not Currently   • Drug use: Never          Current Outpatient Medications:   •  cholecalciferol (VITAMIN D3) 1,000 units tablet, Take 1,000 Units by mouth daily, Disp: , Rfl:   •  magnesium gluconate (MAGONATE) 500 mg tablet, Take 500 mg by mouth 2 (two) times a day, Disp: , Rfl:   •  omega-3-acid ethyl esters (LOVAZA) 1 g capsule, Take 2 g by mouth 2 (two) times a day, Disp: , Rfl:   •  Prenatal Vit-Fe Fumarate-FA (PRENATAL VITAMIN PO), Take 1 capsule by mouth daily, Disp: , Rfl:     She has No Known Allergies. .    ROS negative except as noted in HPI    Objective:  /70   Ht 5' 4.75" (1.645 m)   Wt 73.5 kg (162 lb)   LMP 08/29/2023   Breastfeeding Yes   BMI 27.17 kg/m²     Physical Exam  Constitutional:       Appearance: Normal appearance. Chest:   Breasts:     Right: Normal. No mass or tenderness. Left: Normal. No mass or tenderness. Abdominal:      Palpations: Abdomen is soft. Tenderness: There is no abdominal tenderness. Genitourinary:     General: Normal vulva. Vagina: No bleeding or lesions. Cervix: Normal.      Uterus: Normal. Not tender. Adnexa:         Right: No mass or tenderness. Left: No mass or tenderness. Rectum: No external hemorrhoid. Musculoskeletal:         General: Normal range of motion. Lymphadenopathy:      Upper Body:      Right upper body: No axillary adenopathy. Left upper body: No axillary adenopathy.    Neurological:      Mental Status: She is alert and oriented to person, place, and time.    Psychiatric:         Mood and Affect: Mood normal.         Behavior: Behavior normal.

## 2023-09-20 NOTE — PROGRESS NOTES
Annual Wellness Visit  [address], [address], [facility], [address] [facility],[address]    ASSESSMENT/PLAN: Valerio Escobar is a 25 y.o. [id number] who presents for annual gynecologic exam.    Encounter for routine gynecologic examination  - Routine well woman exam completed today. - Cervical Cancer Screening: Current ASCCP Guidelines reviewed. Last Pap: 03/23/2022 normal. Next Pap Due: 1-2 years, routine.  - HPV Vaccination status: Not immunized  - STI screening offered including HIV testing: offered, pt declined  - Contraceptive counseling discussed. Current contraception: condoms,   - The following were reviewed in today's visit: family planning choices, exercise and healthy diet    Additional problems addressed during this visit:  1. Encounter for annual routine gynecological examination        Next visit: 1 year Wellness      CC:  Annual Gynecologic Examination    HPI: Valerio Escobar is a 25 y.o. [id number] who presents for annual gynecologic examination. She denies any breast, urinary or pelvic issues at today's visit. Period just returned end of August, still nursing. Condoms for contraception. No new partners. Gyn History  Patient's last menstrual period was 08/29/2023. Last Pap: 03/23/2022 was normal    She  reports being sexually active and has had partner(s) who are male. She reports using the following method of birth control/protection: Condom Male. OB History  [id number]    No past medical history on file.      Past Surgical History:  No date: WISDOM TOOTH EXTRACTION     Family History   Problem Relation Age of Onset   • No Known Problems Mother    • Pancreatic cancer Father 61        Social History     Tobacco Use   • Smoking status: Never     Passive exposure: Never   • Smokeless tobacco: Never   Vaping Use   • Vaping Use: Never used   Substance Use Topics   • Alcohol use: Not Currently   • Drug use: Never          Current Outpatient Medications:   • cholecalciferol (VITAMIN D3) 1,000 units tablet, Take 1,000 Units by mouth daily, Disp: , Rfl:   •  magnesium gluconate (MAGONATE) 500 mg tablet, Take 500 mg by mouth 2 (two) times a day, Disp: , Rfl:   •  omega-3-acid ethyl esters (LOVAZA) 1 g capsule, Take 2 g by mouth 2 (two) times a day, Disp: , Rfl:   •  Prenatal Vit-Fe Fumarate-FA (PRENATAL VITAMIN PO), Take 1 capsule by mouth daily, Disp: , Rfl:     She has No Known Allergies. .    ROS negative except as noted in HPI    Objective:  /70   Ht 5' 4.75" (1.645 m)   Wt 73.5 kg (162 lb)   LMP 08/29/2023   Breastfeeding Yes   BMI 27.17 kg/m²      Physical Exam  Constitutional:       Appearance: Normal appearance. Chest:   Breasts:     Right: Normal. No mass or tenderness. Left: Normal. No mass or tenderness. Abdominal:      Palpations: Abdomen is soft. Tenderness: There is no abdominal tenderness. Genitourinary:     General: Normal vulva. Vagina: No bleeding or lesions. Cervix: Normal.      Uterus: Normal. Not tender. Adnexa:         Right: No mass or tenderness. Left: No mass or tenderness. Rectum: No external hemorrhoid. Musculoskeletal:         General: Normal range of motion. Lymphadenopathy:      Upper Body:      Right upper body: No axillary adenopathy. Left upper body: No axillary adenopathy. Neurological:      Mental Status: She is alert and oriented to person, place, and time.    Psychiatric:         Mood and Affect: Mood normal.         Behavior: Behavior normal.

## 2024-05-31 ENCOUNTER — OFFICE VISIT (OUTPATIENT)
Dept: FAMILY MEDICINE CLINIC | Facility: CLINIC | Age: 29
End: 2024-05-31
Payer: COMMERCIAL

## 2024-05-31 VITALS
HEART RATE: 83 BPM | SYSTOLIC BLOOD PRESSURE: 100 MMHG | RESPIRATION RATE: 17 BRPM | TEMPERATURE: 98 F | HEIGHT: 65 IN | OXYGEN SATURATION: 98 % | DIASTOLIC BLOOD PRESSURE: 68 MMHG | BODY MASS INDEX: 24.46 KG/M2 | WEIGHT: 146.8 LBS

## 2024-05-31 DIAGNOSIS — Z13.1 SCREENING FOR DIABETES MELLITUS: ICD-10-CM

## 2024-05-31 DIAGNOSIS — Z13.6 SCREENING FOR CARDIOVASCULAR CONDITION: ICD-10-CM

## 2024-05-31 DIAGNOSIS — E55.9 VITAMIN D DEFICIENCY: ICD-10-CM

## 2024-05-31 DIAGNOSIS — Z00.00 ANNUAL PHYSICAL EXAM: Primary | ICD-10-CM

## 2024-05-31 PROCEDURE — 99395 PREV VISIT EST AGE 18-39: CPT | Performed by: FAMILY MEDICINE

## 2024-05-31 RX ORDER — MULTIVITAMIN
1 CAPSULE ORAL DAILY
COMMUNITY

## 2024-05-31 NOTE — PROGRESS NOTES
Adult Annual Physical  Name: Roshni Wood      : 1995      MRN: [id number]  Encounter Provider: Ya Dumont DO  Encounter Date: 2024   Encounter department: [facility]    Assessment & Plan   1. Annual physical exam  -     Lipid panel; Future  -     Comprehensive metabolic panel; Future  -     CBC and differential; Future  -     TSH, 3rd generation with Free T4 reflex; Future  -     Vitamin D 25 hydroxy; Future  -     Lipid panel  -     Comprehensive metabolic panel  -     CBC and differential  -     TSH, 3rd generation with Free T4 reflex  -     Vitamin D 25 hydroxy  2. Screening for diabetes mellitus  -     Comprehensive metabolic panel; Future  -     Comprehensive metabolic panel  3. Screening for cardiovascular condition  -     Lipid panel; Future  -     Lipid panel  4. Vitamin D deficiency  -     Vitamin D 25 hydroxy; Future  -     Vitamin D 25 hydroxy    Immunizations and preventive care screenings were discussed with patient today. Appropriate education was printed on patient's after visit summary.    Counseling:  Alcohol/drug use: discussed moderation in alcohol intake, the recommendations for healthy alcohol use, and avoidance of illicit drug use.  Dental Health: discussed importance of regular tooth brushing, flossing, and dental visits.  Injury prevention: discussed safety/seat belts, safety helmets, smoke detectors, carbon dioxide detectors, and smoking near bedding or upholstery.  Sexual health: discussed sexually transmitted diseases, partner selection, use of condoms, avoidance of unintended pregnancy, and contraceptive alternatives.  Exercise: the importance of regular exercise/physical activity was discussed. Recommend exercise 3-5 times per week for at least 30 minutes.       Depression Screening and Follow-up Plan: Patient was screened for depression during today's encounter. They screened negative with a PHQ-2 score of 0.        History  of Present Illness     Adult Annual Physical:  Patient presents for annual physical.     Diet and Physical Activity:  - Diet/Nutrition: well balanced diet.  - Exercise: walking.    Depression Screening:  - PHQ-2 Score: 0    General Health:  - Sleep: sleeps well.  - Hearing: normal hearing bilateral ears.  - Vision: goes for regular eye exams.  - Dental: regular dental visits.    /GYN Health:  - Follows with GYN: yes.     Review of Systems   Constitutional:  Negative for chills, fatigue and fever.   HENT:  Negative for congestion, postnasal drip, rhinorrhea and sinus pressure.    Eyes:  Negative for photophobia and visual disturbance.   Respiratory:  Negative for cough and shortness of breath.    Cardiovascular:  Negative for chest pain, palpitations and leg swelling.   Gastrointestinal:  Negative for abdominal pain, constipation, diarrhea, nausea and vomiting.   Genitourinary:  Negative for difficulty urinating and dysuria.   Musculoskeletal:  Negative for arthralgias and myalgias.   Skin:  Negative for color change and rash.   Neurological:  Negative for dizziness, weakness, light-headedness and headaches.       Medical History Reviewed by provider this encounter:  Tobacco  Allergies  Meds  Problems  Med Hx  Surg Hx  Fam Hx       Past Medical History   History reviewed. No pertinent past medical history.  Past Surgical History:   Procedure Laterality Date    WISDOM TOOTH EXTRACTION       Family History   Problem Relation Age of Onset    No Known Problems Mother     Pancreatic cancer Father 63     Current Outpatient Medications on File Prior to Visit   Medication Sig Dispense Refill    cholecalciferol (VITAMIN D3) 1,000 units tablet Take 1,000 Units by mouth daily      Multiple Vitamin (multivitamin) capsule Take 1 capsule by mouth daily      omega-3-acid ethyl esters (LOVAZA) 1 g capsule Take 2 g by mouth 2 (two) times a day      magnesium gluconate (MAGONATE) 500 mg tablet Take 500 mg by mouth 2 (two)  "times a day (Patient not taking: Reported on 5/31/2024)      Prenatal Vit-Fe Fumarate-FA (PRENATAL VITAMIN PO) Take 1 capsule by mouth daily (Patient not taking: Reported on 5/31/2024)       No current facility-administered medications on file prior to visit.   No Known Allergies   Current Outpatient Medications on File Prior to Visit   Medication Sig Dispense Refill    cholecalciferol (VITAMIN D3) 1,000 units tablet Take 1,000 Units by mouth daily      Multiple Vitamin (multivitamin) capsule Take 1 capsule by mouth daily      omega-3-acid ethyl esters (LOVAZA) 1 g capsule Take 2 g by mouth 2 (two) times a day      magnesium gluconate (MAGONATE) 500 mg tablet Take 500 mg by mouth 2 (two) times a day (Patient not taking: Reported on 5/31/2024)      Prenatal Vit-Fe Fumarate-FA (PRENATAL VITAMIN PO) Take 1 capsule by mouth daily (Patient not taking: Reported on 5/31/2024)       No current facility-administered medications on file prior to visit.      Social History     Tobacco Use    Smoking status: Never     Passive exposure: Never    Smokeless tobacco: Never   Vaping Use    Vaping status: Never Used   Substance and Sexual Activity    Alcohol use: Not Currently    Drug use: Never    Sexual activity: Yes     Partners: Male     Birth control/protection: Condom Male       Objective     /68 (BP Location: Right arm, Patient Position: Sitting, Cuff Size: Large)   Pulse 83   Temp 98 °F (36.7 °C) (Tympanic)   Resp 17   Ht 5' 4.8\" (1.646 m)   Wt 66.6 kg (146 lb 12.8 oz)   LMP 05/18/2024   SpO2 98%   Breastfeeding No   BMI 24.58 kg/m²     Physical Exam  Constitutional:       General: She is not in acute distress.     Appearance: Normal appearance. She is not ill-appearing, toxic-appearing or diaphoretic.   HENT:      Head: Normocephalic and atraumatic.      Right Ear: Tympanic membrane and ear canal normal.      Left Ear: Tympanic membrane and ear canal normal.      Nose: Nose normal. No congestion.      " Mouth/Throat:      Mouth: Mucous membranes are moist.      Pharynx: Oropharynx is clear. No oropharyngeal exudate.   Eyes:      Extraocular Movements: Extraocular movements intact.      Conjunctiva/sclera: Conjunctivae normal.      Pupils: Pupils are equal, round, and reactive to light.   Cardiovascular:      Rate and Rhythm: Normal rate and regular rhythm.      Pulses: Normal pulses.      Heart sounds: No murmur heard.  Pulmonary:      Effort: Pulmonary effort is normal.      Breath sounds: Normal breath sounds. No wheezing, rhonchi or rales.   Abdominal:      General: Bowel sounds are normal. There is no distension.      Palpations: Abdomen is soft.      Tenderness: There is no abdominal tenderness.   Musculoskeletal:         General: No swelling or tenderness. Normal range of motion.      Cervical back: Normal range of motion and neck supple.   Skin:     General: Skin is warm and dry.      Capillary Refill: Capillary refill takes less than 2 seconds.   Neurological:      General: No focal deficit present.      Mental Status: She is alert and oriented to person, place, and time.      Cranial Nerves: No cranial nerve deficit.   Psychiatric:         Mood and Affect: Mood normal.         Behavior: Behavior normal.         Thought Content: Thought content normal.

## 2024-06-13 ENCOUNTER — TELEPHONE (OUTPATIENT)
Age: 29
End: 2024-06-13

## 2024-06-13 NOTE — TELEPHONE ENCOUNTER
Pt called. Has labs ordered from recent visit, was advised by Holistic Doctor and  requests that additional labs be ordered by PCP if possible:    ESR  Ferritin  Serum Iron  Complete Thyroid Panel  TIBC  Vitamin B12  Transferrin  Transferrin Saturation %  Vitamin D 250 H and total  Omega 3 Fatty Acid  Omega 6 Fatty Acid    Please call Pt with further questions. Thank you

## 2024-06-17 NOTE — TELEPHONE ENCOUNTER
Patient returned call aware of Dr. Dumont recommendations.     Soonest appt available not until Thursday. Patient requesting to have labs done prior to Thursday will be going out of town.     Spoke to office staff will be giving patient a call back with further plan of care.

## 2024-06-20 ENCOUNTER — TELEMEDICINE (OUTPATIENT)
Dept: FAMILY MEDICINE CLINIC | Facility: CLINIC | Age: 29
End: 2024-06-20
Payer: COMMERCIAL

## 2024-06-20 ENCOUNTER — TELEPHONE (OUTPATIENT)
Age: 29
End: 2024-06-20

## 2024-06-20 DIAGNOSIS — R63.4 WEIGHT LOSS, UNINTENTIONAL: ICD-10-CM

## 2024-06-20 DIAGNOSIS — R53.83 FATIGUE, UNSPECIFIED TYPE: Primary | ICD-10-CM

## 2024-06-20 PROCEDURE — 99214 OFFICE O/P EST MOD 30 MIN: CPT | Performed by: FAMILY MEDICINE

## 2024-06-20 NOTE — PROGRESS NOTES
Virtual Regular Visit    Verification of patient location:    Patient is located at Home in the following state in which I hold an active license [place]      Assessment/Plan:    Problem List Items Addressed This Visit          Other    Fatigue - Primary    Relevant Orders    Sedimentation rate, automated    Fe+TIBC+Adrian    Vitamin B12    Thyroid Antibodies Panel    Transferrin    Omega 3 and 6 Fatty Acids    Weight loss, unintentional    Relevant Orders    Sedimentation rate, automated    Fe+TIBC+Adrian    Vitamin B12    Thyroid Antibodies Panel    Transferrin    Omega 3 and 6 Fatty Acids     Roshni requested labs be ordered that were recommended by a holistic doctor she saw in the past.  I advised her to check with her insurance on cost for these labs as they are not routine and may not be covered. She will review results with her holistic doctor when they are back.          Reason for visit is   Chief Complaint   Patient presents with    Virtual Regular Visit        Encounter provider Ya Dumont DO      Recent Visits  No visits were found meeting these conditions.  Showing recent visits within past 7 days and meeting all other requirements  Today's Visits  Date Type Provider Dept   06/20/24 Telemedicine Ya Dumont DO [facility]   Showing today's visits and meeting all other requirements  Future Appointments  No visits were found meeting these conditions.  Showing future appointments within next 150 days and meeting all other requirements       The patient was identified by name and date of birth. Roshni Wood was informed that this is a telemedicine visit and that the visit is being conducted through the Epic Embedded platform. She agrees to proceed..  My office door was closed. No one else was in the room.  She acknowledged consent and understanding of privacy and security of the video platform. The patient has agreed to participate and understands they can discontinue the visit at any  time.    Patient is aware this is a billable service.     Subjective  Roshni Wood is a 29 y.o. female being seen for fatigue and depression.  She continues to struggle with some post partum symptoms, depression, fatigue  Saw a holistic doctor who told her to get labs done and she is requesting these be done.      HPI     No past medical history on file.    Past Surgical History:   Procedure Laterality Date    WISDOM TOOTH EXTRACTION         Current Outpatient Medications   Medication Sig Dispense Refill    cholecalciferol (VITAMIN D3) 1,000 units tablet Take 1,000 Units by mouth daily      magnesium gluconate (MAGONATE) 500 mg tablet Take 500 mg by mouth 2 (two) times a day (Patient not taking: Reported on 5/31/2024)      Multiple Vitamin (multivitamin) capsule Take 1 capsule by mouth daily      omega-3-acid ethyl esters (LOVAZA) 1 g capsule Take 2 g by mouth 2 (two) times a day      Prenatal Vit-Fe Fumarate-FA (PRENATAL VITAMIN PO) Take 1 capsule by mouth daily (Patient not taking: Reported on 5/31/2024)       No current facility-administered medications for this visit.        No Known Allergies    Review of Systems   Constitutional:  Positive for fatigue and unexpected weight change. Negative for chills and fever.   HENT:  Negative for congestion, postnasal drip, rhinorrhea and sinus pressure.    Eyes:  Negative for photophobia and visual disturbance.   Respiratory:  Negative for cough and shortness of breath.    Cardiovascular:  Negative for chest pain, palpitations and leg swelling.   Gastrointestinal:  Negative for abdominal pain, constipation, diarrhea, nausea and vomiting.   Genitourinary:  Negative for difficulty urinating and dysuria.   Musculoskeletal:  Negative for arthralgias and myalgias.   Skin:  Negative for color change and rash.   Neurological:  Negative for dizziness, weakness, light-headedness and headaches.       Video Exam    There were no vitals filed for this visit.    Physical  Exam  Constitutional:       General: She is not in acute distress.     Appearance: Normal appearance. She is not ill-appearing, toxic-appearing or diaphoretic.   HENT:      Head: Normocephalic and atraumatic.      Nose: Nose normal.   Eyes:      Extraocular Movements: Extraocular movements intact.      Conjunctiva/sclera: Conjunctivae normal.   Pulmonary:      Effort: Pulmonary effort is normal. No respiratory distress.   Musculoskeletal:         General: Normal range of motion.      Cervical back: Normal range of motion.   Neurological:      Mental Status: She is alert.   Psychiatric:         Mood and Affect: Mood normal.         Behavior: Behavior normal.          Visit Time  Total Visit Duration: 15

## 2024-06-20 NOTE — TELEPHONE ENCOUNTER
I called and L/M on patients voice mail that we are unable to send an email for her mychart, she needs to contact that [phone] number to have her issues resolved.

## 2024-06-20 NOTE — TELEPHONE ENCOUNTER
Patient having trouble logging into [employer]. Please send email link for virtual . Email verified.

## 2024-07-19 LAB
25(OH)D3+25(OH)D2 SERPL-MCNC: 51.8 NG/ML (ref 30–100)
ALBUMIN SERPL-MCNC: 4.3 G/DL (ref 3.9–4.9)
ALP SERPL-CCNC: 67 IU/L (ref 44–121)
ALT SERPL-CCNC: 29 IU/L (ref 0–32)
AST SERPL-CCNC: 22 IU/L (ref 0–40)
BASOPHILS # BLD AUTO: 0 X10E3/UL (ref 0–0.2)
BASOPHILS NFR BLD AUTO: 0 %
BILIRUB SERPL-MCNC: 0.3 MG/DL (ref 0–1.2)
BUN SERPL-MCNC: 11 MG/DL (ref 6–20)
BUN/CREAT SERPL: 17 (ref 9–23)
CALCIUM SERPL-MCNC: 8.4 MG/DL (ref 8.7–10.2)
CHLORIDE SERPL-SCNC: 101 MMOL/L (ref 96–106)
CHOLEST SERPL-MCNC: 123 MG/DL (ref 100–199)
CHOLEST/HDLC SERPL: 3.2 RATIO (ref 0–4.4)
CO2 SERPL-SCNC: 23 MMOL/L (ref 20–29)
CREAT SERPL-MCNC: 0.63 MG/DL (ref 0.57–1)
EGFR: 122 ML/MIN/1.73
EOSINOPHIL # BLD AUTO: 0.1 X10E3/UL (ref 0–0.4)
EOSINOPHIL NFR BLD AUTO: 2 %
ERYTHROCYTE [DISTWIDTH] IN BLOOD BY AUTOMATED COUNT: 12.8 % (ref 11.7–15.4)
ERYTHROCYTE [SEDIMENTATION RATE] IN BLOOD BY WESTERGREN METHOD: 2 MM/HR (ref 0–32)
FERRITIN SERPL-MCNC: 45 NG/ML (ref 15–150)
GLOBULIN SER-MCNC: 2.4 G/DL (ref 1.5–4.5)
GLUCOSE SERPL-MCNC: 94 MG/DL (ref 70–99)
HCT VFR BLD AUTO: 42.7 % (ref 34–46.6)
HDLC SERPL-MCNC: 38 MG/DL
HGB BLD-MCNC: 13.7 G/DL (ref 11.1–15.9)
IMM GRANULOCYTES # BLD: 0 X10E3/UL (ref 0–0.1)
IMM GRANULOCYTES NFR BLD: 0 %
IRON SATN MFR SERPL: 16 % (ref 15–55)
IRON SERPL-MCNC: 51 UG/DL (ref 27–159)
LDLC SERPL CALC-MCNC: 72 MG/DL (ref 0–99)
LYMPHOCYTES # BLD AUTO: 2.7 X10E3/UL (ref 0.7–3.1)
LYMPHOCYTES NFR BLD AUTO: 47 %
MCH RBC QN AUTO: 28.8 PG (ref 26.6–33)
MCHC RBC AUTO-ENTMCNC: 32.1 G/DL (ref 31.5–35.7)
MCV RBC AUTO: 90 FL (ref 79–97)
MONOCYTES # BLD AUTO: 0.5 X10E3/UL (ref 0.1–0.9)
MONOCYTES NFR BLD AUTO: 8 %
NEUTROPHILS # BLD AUTO: 2.5 X10E3/UL (ref 1.4–7)
NEUTROPHILS NFR BLD AUTO: 43 %
PLATELET # BLD AUTO: 164 X10E3/UL (ref 150–450)
POTASSIUM SERPL-SCNC: 4 MMOL/L (ref 3.5–5.2)
PROT SERPL-MCNC: 6.7 G/DL (ref 6–8.5)
RBC # BLD AUTO: 4.76 X10E6/UL (ref 3.77–5.28)
SL AMB VLDL CHOLESTEROL CALC: 13 MG/DL (ref 5–40)
SODIUM SERPL-SCNC: 136 MMOL/L (ref 134–144)
TIBC SERPL-MCNC: 327 UG/DL (ref 250–450)
TRANSFERRIN SERPL-MCNC: 274 MG/DL (ref 192–364)
TRIGL SERPL-MCNC: 62 MG/DL (ref 0–149)
TSH SERPL DL<=0.005 MIU/L-ACNC: 0.95 UIU/ML (ref 0.45–4.5)
UIBC SERPL-MCNC: 276 UG/DL (ref 131–425)
VIT B12 SERPL-MCNC: 943 PG/ML (ref 232–1245)
WBC # BLD AUTO: 5.8 X10E3/UL (ref 3.4–10.8)

## 2024-08-12 LAB
ARACHIDONIC ACID/EPA RATIO: 44 (ref 3.7–40.7)
ARACHIDONIC ACID: 13.7 % BY WT (ref 8.6–15.6)
DHA: 2.4 % BY WT (ref 1.4–5.1)
DPA: 1 % BY WT (ref 0.8–1.8)
EPA: 0.3 % BY WT (ref 0.2–2.3)
GENE DIS ANL CARRIER INTERP-IMP: NORMAL
LINOLEIC ACID: 22.7 % BY WT (ref 18.6–29.5)
OMEGA-6 TOTAL: 40.3 % BY WT
OMEGACHECK(TM): 3.8 % BY WT
PHYTANATE SERPL-SCNC: 0.11 UMOL/L (ref 0–0.26)
PRISTANATE SERPL-SCNC: 1.84 UMOL/L (ref 0.25–2.07)
PRISTANATE/PHYTANATE SERPL-SRTO: 0.06 {RATIO} (ref 0–0.28)
VLCFA C22:0 SERPL-SCNC: 68.67 UMOL/L (ref 28.94–93.5)
VLCFA C24:0 SERPL-SCNC: 58.33 UMOL/L (ref 24.25–77.75)
VLCFA C24:0/C22:0 SERPL-SRTO: 0.01 {RATIO} (ref 0–0.01)
VLCFA C26:0 SERPL-SCNC: 0.5 UMOL/L (ref 0.17–0.73)
VLCFA C26:0/C22:0 SERPL-SRTO: 0.85 {RATIO} (ref 0.64–1.02)
W3 FA SERPL-SCNC: 3.8 % BY WT
W6 FA/W3 FA SERPL-SRTO: 10.7 (ref 3.7–14.4)

## 2024-10-02 NOTE — PROGRESS NOTES
"Pregnancy Confirmation Visit  [facility] - [place] [address], [address], [place] [address]    Assessment/Plan:  29 y.o.  presenting with missed menses.  Viable pregnancy 12w4d by ultrasound today.  - Continue/start prenatal vitamin  - We reviewed her current medications and discussed which are safe to continue in pregnancy  - Schedule prenatal intake with RN and initial prenatal visit; prenatal labs will be ordered during the prenatal intake    Additional Pregnancy Problems Addressed today:   1. Amenorrhea  -     Ambulatory Referral to Maternal Fetal Medicine; Future; Expected date: 10/09/2024  -     AMB  OB < 14 weeks single or first gestation level 1      Next appt: OB Intake     Subjective:    CC: Missed period    Roshni Wood is a 29 y.o.  who presents with missed menses.  Patient's last menstrual period was 2024. Would be 12w3d by LMP.     Patient notes that this pregnancy was not planned but is now desired.  She was using contraception/condoms irregularly at the time of conception.     She reports she is certain of her LMP and that she has regular menses, approximately q28 days. She has had no vaginal bleeding since her LMP.      She has had  nausea and vomiting for which she is using B6 and Unsiom OTC. Symptoms have improved with the medications.     Objective:  /62 (BP Location: Left arm, Patient Position: Sitting, Cuff Size: Standard)   Ht 5' 4\" (1.626 m)   Wt 67 kg (147 lb 9.6 oz)   LMP 2024   BMI 25.34 kg/m²     Physical Exam:  General: Well appearing, no distress  CV: Regular rate  Respiratory: Unlabored breathing  Abdomen: Soft, nontender  Extremities: Without edema  Mood and Affect: Appropriate    Transvaginal Pelvic Ultrasound  Sam IUP  Yolk sac: Present  Fetal Pole: Present  CRL consistent with EGA 12w4d -> EDC 2025  Cardiac activity: Present  FHR  bpm  No adnexal masses appreciated    Will assign dating based on " US.  Final Gestational age: 12w4d  Final Estimated Date of Delivery: 04/18/2025

## 2024-10-08 ENCOUNTER — ULTRASOUND (OUTPATIENT)
Dept: OBGYN CLINIC | Facility: CLINIC | Age: 29
End: 2024-10-08
Payer: COMMERCIAL

## 2024-10-08 VITALS
WEIGHT: 147.6 LBS | SYSTOLIC BLOOD PRESSURE: 100 MMHG | HEIGHT: 64 IN | BODY MASS INDEX: 25.2 KG/M2 | DIASTOLIC BLOOD PRESSURE: 62 MMHG

## 2024-10-08 DIAGNOSIS — N91.2 AMENORRHEA: Primary | ICD-10-CM

## 2024-10-08 PROCEDURE — 99214 OFFICE O/P EST MOD 30 MIN: CPT | Performed by: STUDENT IN AN ORGANIZED HEALTH CARE EDUCATION/TRAINING PROGRAM

## 2024-10-08 PROCEDURE — 76817 TRANSVAGINAL US OBSTETRIC: CPT | Performed by: STUDENT IN AN ORGANIZED HEALTH CARE EDUCATION/TRAINING PROGRAM

## 2024-10-08 RX ORDER — FERROUS GLUCONATE 324(38)MG
324 TABLET ORAL
COMMUNITY

## 2024-10-08 NOTE — LETTER
October 8, 2024     Roshni Wood    Patient: Roshni Wood   YOB: 1995   Date of Visit: 10/8/2024       Dear to whom it may concern,     Roshni Wood had a initial consultation for new pregnancy. Her due date is April 2025. If you have questions, please do not hesitate to call me. I look forward to following your patient along with you.        Sincerely,    Cali Dc MD

## 2024-10-10 ENCOUNTER — TELEPHONE (OUTPATIENT)
Age: 29
End: 2024-10-10

## 2024-10-10 NOTE — PROGRESS NOTES
Ultrasound Probe Disinfection    A transvaginal ultrasound was performed.   Prior to use, disinfection was performed with High Level Disinfection Process ([place])  Probe serial number [id number]:  [id number] was used    Dara Palladino, MA  10/10/24  1:48 PM

## 2024-10-10 NOTE — TELEPHONE ENCOUNTER
Incoming call from patient regarding MFM referral. Attempted warm transfer to office with no answer. Please call back to assist with referral.

## 2024-10-18 NOTE — PATIENT INSTRUCTIONS
Kameron Wood,     It was so nice getting to know you today. Remember if you have an urgent or time sensitive concern, please call the practice phone number so a clinical triage team member can review your symptoms and get in touch with our on call provider if necessary. If you have general questions or need help navigating our services please REPLY to this message so it comes directly to me and I will respond between other patients. If I am out of the office for any reason, another nurse navigator may reach out to help you. Our Pregnancy Essential Guide is a great online resource--please use the link below.     [facility] Pregnancy Essentials Guide  [facility] ([email or web address])     Again, Congratulations and Thank You for choosing [facility]. I look forward to helping you through this journey.   Chevy BERTRAND Navigjeanna

## 2024-10-18 NOTE — PROGRESS NOTES
OB INTAKE INTERVIEW  Patient is 29 y.o. who presents for OB intake at 14.5 wks  She is accompanied by 3 children during this encounter.  The father of her baby (Alex) is involved in the pregnancy and is 38 years old.      Last Menstrual Period: 24  Reports her menstrual cycles are regular, approximately q28 days. Was not using contraception a time of delivery. .   Ultrasound: Measured 12 weeks 4 days on 10/08/24  Estimated Date of Delivery: 2024    Signs/Symptoms of Pregnancy  Current pregnancy symptoms:  Nausea: has improved with Unisom/B6  Recommended to try to eat 5-6 small meals a day, increase protein with meals/snacks, in order to keep stomach full at all times. Try bland foods like plain crackers, toast as well as carbonated drinks like gingerale. Hard peppermint or cheryl candy, is a natural treatment for nausea,  B-sonia pops  with B6 ( available at the pharmacy), B6 25 mg in the AM and 25 mg in PM. May combine with Unisom 12.5 mg at night. Unisom may cause drowsiness.  High complex carbohydrate snack  before bedtime.    Constipation no  Headaches no  Cramping/spotting no  PICA cravings no    Diabetes-  There is no height or weight on file to calculate BMI.  If patient has 1 or more, please order early 1 hour GTT  History of GDM no  BMI >35 no  History of PCOS or current metformin use no  History of LGA/macrosomic infant (4000g/9lbs) no    If patient has 2 or more, please order early 1 hour GTT  BMI>30 no  AMA no  First degree relative with type 2 diabetes no  History of chronic HTN, hyperlipidemia, elevated A1C no  High risk race (, , ,  or ) no    Hypertension- if you answer yes to any of the following, please order baseline preeclampsia labs (cbc, comprehensive metabolic panel, urine protein creatinine ratio, uric acid)  History of of chronic HTN no  History of gestational HTN no  History of preeclampsia, eclampsia, or HELLP  syndrome no  History of diabetes no  History of lupus, autoimmune disease, kidney disease no    Thyroid- if yes order TSH with reflex T4  History of thyroid disease no    Bleeding Disorder or Hx of DVT-patient or first degree relative with history of. Order the following if not done previously.   (Factor V, antithrombin III, prothrombin gene mutation, protein C and S Ag, lupus anticoagulant, anticardiolipin, beta-2 glycoprotein)   no    OB/GYN-  History of abnormal pap smear no       Date of last pap smear 2022  History of HPV no  History of Herpes/HSV no  History of other STI (gonorrhea, chlamydia, trich) no  History of prior  YES x3  History of prior  no  History of  delivery prior to 36 weeks 6 days YES, 2019 delivered at 36w3d  History of blood transfusion no  Ok for blood transfusion Yes    Substance screening-   History of tobacco use no  Currently using tobacco no  Substance Use Screen Level No Risk     MRSA Screening-   Does the pt have a hx of MRSA? no    Immunizations:  Influenza vaccine given this season No, discussed Flu vaccine ius recommended.   Discussed Tdap vaccine yes  Discussed COVID Vaccine yes    Genetic/MFM-  Do you or your partner have a history of any of the following in yourselves or first degree relatives?  Cystic fibrosis no  Spinal muscular atrophy no  Hemoglobinopathy/Sickle Cell/Thalassemia no  Fragile X Intellectual Disability no    If yes, discuss Carrier Screening and recommend consultation with [facility]/Genetic Counseling and place specific [facility] Referral for.    If no, discuss Carrier Screening being completed once in a lifetime as a standard of care lab test. Place orders for Cystic Fibrosis Gene Test (FLU801) and Spinal Muscular Atrophy DNA ([id number])  Declines carrier screening     Appointment for Nuchal Translucency Ultrasound at [facility] scheduled for  Detailed Ultrasound 24  Declined genetic screening and early anatomy US.     Interview education  [facility]  Pregnancy Essentials Book reviewed, discussed and attached to their AVS yes      Ambulatory Referral to  placed  EPDS: 5    Prenatal lab work scripts YES  Preferred Lab: [facility]   Extra labs ordered: none       Aspirin/Preeclampsia Screen    Risk Level Risk Factor Recommendation   LOW Prior Uncomplicated full-term delivery YES No Aspirin recommendation        MODERATE Nulliparity no Recommend low-dose aspirin if     BMI>30 no 2 or more moderate risk factors    Family History Preeclampsia (mother/sister) YES, both her mother and (1) sister had Preeclampsia      35yr old or greater no     Black Race, Concern for SDOH/Low Socioeconomic no     IVF Pregnancy  no     Personal History Risks (low birth weight, prior adverse preg outcome, >10yr preg interval) YES, History of Pre-term          HIGH History of Preeclampsia no Recommend low-dose aspirin if     Multifetal gestation no 1 or more high risk factors    Chronic HTN no     Type 1 or 2 Diabetes no     Renal Disease no     Autoimmune Disease  no      Contraindications to ASA therapy:  NSAID/ ASA allergy: no  Nasal polyps: no  Asthma with history of ASA induced bronchospasm: no  Relative contraindications:  History of GI bleed: no  Active peptic ulcer disease: no  Severe hepatic dysfunction: no    Patient should be recommended to take ASA 162mg during this pregnancy from 12-36wks to lower her risk of preeclampsia:   Moderate Risk Risk factors include family history of preeclampsia in her sister and mother., pre-term delivery.       The patient has a history now or in prior pregnancy notable for:  See Below   Pre-term delivery   History of Precipitous delivery       Details that I feel the provider should be aware of:   Roshni is a current patient of [facility] This is her fourth pregnancy. This was an unplanned but welcomed pregnancy. Her pregnancy history includes:   Spontaneous  vaginal delivery in 2019 at 36w3d, son NICU x 2 weeks.    History of precipitous labor () delivered at home @ 38w5d, seen by Mid-Wife post delivery, did nopt proceed to hospital post delivery. .     @ 38w4d-Midwife     Denies any current or past medical concerns.       PN1 visit scheduled. The patient was oriented to our practice, the navigator role, reviewed delivering physicians and [facility] for Delivery. All questions were answered.    Interviewed by: ABDULAZIZ Painting RN

## 2024-10-23 ENCOUNTER — INITIAL PRENATAL (OUTPATIENT)
Dept: OBGYN CLINIC | Facility: CLINIC | Age: 29
End: 2024-10-23

## 2024-10-23 VITALS
DIASTOLIC BLOOD PRESSURE: 60 MMHG | SYSTOLIC BLOOD PRESSURE: 115 MMHG | BODY MASS INDEX: 25.1 KG/M2 | WEIGHT: 147 LBS | HEIGHT: 64 IN

## 2024-10-23 DIAGNOSIS — Z36.89 ENCOUNTER FOR OTHER SPECIFIED ANTENATAL SCREENING: ICD-10-CM

## 2024-10-23 DIAGNOSIS — Z3A.14 14 WEEKS GESTATION OF PREGNANCY: Primary | ICD-10-CM

## 2024-10-23 PROCEDURE — OBC: Performed by: NURSE PRACTITIONER

## 2024-10-23 RX ORDER — DIPHENHYDRAMINE HYDROCHLORIDE 25 MG/1
25 CAPSULE ORAL DAILY
COMMUNITY

## 2024-10-25 ENCOUNTER — PATIENT OUTREACH (OUTPATIENT)
Dept: OBGYN CLINIC | Facility: CLINIC | Age: 29
End: 2024-10-25

## 2024-10-25 NOTE — PROGRESS NOTES
SW referred for initial prenatal assessment. Patient is , [id number] with an SANDRO of 25. Patient agreeable to speaking with SW by phone today.    Patient reports this is an unplanned but welcomed pregnancy. She and MELLY both drive. Patient is a SAHM, FOB works full time. She is already connected with MA and is in the process of reinstating their SNAP benefits. Interested in WIC info - sent via Find Help. Denies needing other resources for parenting education or baby supplies today. Patient denies current or h/o substance use, DV/IPV, CYS involvement, and legal concerns.     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 Not interested in medication or therapy at this time. Indicates good support from MELLY, his mom, and friends from [employer]. Does state that her support system isn't always as much as she needs - agreeable to SW sending information for [employer] support group via [employer]. Enjoys spending time alone when she can for stress relief.     No other SW needs identified at this time, SW closing referral. Please re-refer as needed.

## 2024-11-06 ENCOUNTER — INITIAL PRENATAL (OUTPATIENT)
Dept: OBGYN CLINIC | Facility: CLINIC | Age: 29
End: 2024-11-06
Payer: COMMERCIAL

## 2024-11-06 VITALS
WEIGHT: 158.4 LBS | BODY MASS INDEX: 27.04 KG/M2 | HEIGHT: 64 IN | SYSTOLIC BLOOD PRESSURE: 118 MMHG | DIASTOLIC BLOOD PRESSURE: 62 MMHG

## 2024-11-06 DIAGNOSIS — Z3A.16 16 WEEKS GESTATION OF PREGNANCY: Primary | ICD-10-CM

## 2024-11-06 DIAGNOSIS — O09.899 SHORT INTERVAL BETWEEN PREGNANCIES AFFECTING PREGNANCY, ANTEPARTUM: ICD-10-CM

## 2024-11-06 DIAGNOSIS — Z87.59 HISTORY OF PRECIPITOUS DELIVERY: ICD-10-CM

## 2024-11-06 LAB
SL AMB  POCT GLUCOSE, UA: NORMAL
SL AMB POCT URINE PROTEIN: NORMAL

## 2024-11-06 PROCEDURE — 81002 URINALYSIS NONAUTO W/O SCOPE: CPT | Performed by: NURSE PRACTITIONER

## 2024-11-06 PROCEDURE — PNV: Performed by: NURSE PRACTITIONER

## 2024-11-06 RX ORDER — ASPIRIN 81 MG/1
162 TABLET, CHEWABLE ORAL DAILY
Qty: 60 TABLET | Refills: 2 | Status: SHIPPED | OUTPATIENT
Start: 2024-11-06

## 2024-11-06 NOTE — ASSESSMENT & PLAN NOTE
Declines flu vaccine, genetic testing including NT and MSAFP  Declines physical exam with pelvic today, 3 kids present at visit  Discussed need for routine cultures at next visit  Anatomy scan scheduled

## 2024-11-06 NOTE — PATIENT INSTRUCTIONS
Please call with any concerns  Please start your low dose aspirin therapy now and take until 36 weeks, a prescription was sent to your pharmacy.

## 2024-11-06 NOTE — PROGRESS NOTES
"Routine Prenatal Visit  [facility] - [place]  [address], [place] [address]    Assessment/Plan:  Roshni is a 29 y.o. year old  at 16w5d who presents for routine prenatal visit.     1. 16 weeks gestation of pregnancy  Assessment & Plan:  Declines flu vaccine, genetic testing including NT and MSAFP  Declines physical exam with pelvic today, 3 kids present at visit  Discussed need for routine cultures at next visit  Anatomy scan scheduled  Orders:  -     POCT urine dip  -     aspirin 81 mg chewable tablet; Chew 2 tablets (162 mg total) daily  2.  delivery  Assessment & Plan:  Subsequent FTSVD x 2  3. History of precipitous delivery  Assessment & Plan:  Subsequent 5 hour labor with last baby   4. Short interval between pregnancies affecting pregnancy, antepartum  Assessment & Plan:  Recommend low dose aspirin therapy until 36 weeks, FHX preeclampisa, 2 moderate risk factors   Orders:  -     aspirin 81 mg chewable tablet; Chew 2 tablets (162 mg total) daily      Next OB Visit 4 weeks.    Subjective:     CC: Prenatal care    Roshni Wood is a 29 y.o.  female who presents for routine prenatal care at 16w5d. Feels well, no complaints.  Pregnancy ROS: no leakage of fluid, pelvic pain, or vaginal bleeding.  normal fetal movement.    The following portions of the patient's history were reviewed and updated as appropriate: allergies, current medications, past family history, past medical history, obstetric history, gynecologic history, past social history, past surgical history and problem list.      Objective:  /62 (BP Location: Left arm, Patient Position: Sitting, Cuff Size: Standard)   Ht 5' 4\" (1.626 m)   Wt 71.8 kg (158 lb 6.4 oz)   LMP 2024 (Exact Date)   BMI 27.19 kg/m²   Pregravid Weight/BMI: 65.8 kg (145 lb) (BMI 24.88)  Current Weight: 71.8 kg (158 lb 6.4 oz)   Total Weight Gain: 6.078 kg (13 lb 6.4 oz)   Pre- Vitals      Flowsheet Row Most " Recent Value   Prenatal Assessment    Fetal Heart Rate 158   Fundal Height (cm) 16 cm   Movement Present   Prenatal Vitals    Blood Pressure 118/62   Weight - Scale 71.8 kg (158 lb 6.4 oz)   Urine Albumin/Glucose    Dilation/Effacement/Station    Vaginal Drainage    Draining Fluid No   Edema    LLE Edema None   RLE Edema None             General: Well appearing, no distress  Abdomen: Soft, gravid, nontender  Extremities: Non tender.

## 2024-11-08 ENCOUNTER — TELEPHONE (OUTPATIENT)
Age: 29
End: 2024-11-08

## 2024-11-08 NOTE — TELEPHONE ENCOUNTER
"Patient calling to inquire about labs ordered. Note lab slip is not itemized and she is unsure if she needs to fast for the testing. Review [facility] website to determine which tests are included in the \"Pregnancy, Initial Screen.\"     Testing includes \"CBC with platelet count and differential; ABO grouping and Rh typing; antibody screen (includes ID and titer of all irregular antibodies detected); urinalysis, complete with microscopic examination; urine culture, comprehensive; HBsAg screen; rubella antibodies, IgG; syphilis serology (if RPR positive, Treponema pallidum-specific test is performed); HIV p24 antigen/antibody screen with reflex; HCV antibody with reflex to quantitative real-time PCR; Chlamydia trachomatis and Neisseria gonorrhoeae NAAT.\"     Reviewed comment per Yvette that they not run GC/CH test. Confirmed patient does not need to be fasting for tests. Patient verbalizes understanding.      Patient states she will have  for her next appointment and can do GC/CH swab at that time. Appointment note updated with request.     "

## 2024-11-18 LAB
EXTERNAL ABO GROUPING: NORMAL
EXTERNAL ANTIBODY SCREEN: NORMAL
EXTERNAL HEMOGLOBIN: 13.3 G/DL
EXTERNAL HEPATITIS B SURFACE ANTIGEN: NORMAL
EXTERNAL HIV-1/2 AB-AG: NORMAL
EXTERNAL PLATELET COUNT: 213 K/ΜL
EXTERNAL RH FACTOR: POSITIVE
EXTERNAL RUBELLA IGG QUANTITATION: 1.31
EXTERNAL SYPHILIS TOTAL IGG/IGM SCREENING: NORMAL

## 2024-11-21 ENCOUNTER — RESULTS FOLLOW-UP (OUTPATIENT)
Dept: OBGYN CLINIC | Facility: CLINIC | Age: 29
End: 2024-11-21

## 2024-11-21 DIAGNOSIS — O23.40 URINARY TRACT INFECTION IN MOTHER DURING PREGNANCY, ANTEPARTUM: Primary | ICD-10-CM

## 2024-11-21 LAB
ABO GROUP BLD: ABNORMAL
APPEARANCE UR: CLEAR
BACTERIA UR CULT: ABNORMAL
BACTERIA UR CULT: ABNORMAL
BACTERIA URNS QL MICRO: ABNORMAL
BASOPHILS # BLD AUTO: 0 X10E3/UL (ref 0–0.2)
BASOPHILS NFR BLD AUTO: 0 %
BILIRUB UR QL STRIP: NEGATIVE
BLD GP AB SCN SERPL QL: NEGATIVE
C TRACH RRNA SPEC QL NAA+PROBE: NEGATIVE
CASTS URNS QL MICRO: ABNORMAL /LPF
COLOR UR: YELLOW
EOSINOPHIL # BLD AUTO: 0.1 X10E3/UL (ref 0–0.4)
EOSINOPHIL NFR BLD AUTO: 0 %
EPI CELLS #/AREA URNS HPF: >10 /HPF (ref 0–10)
ERYTHROCYTE [DISTWIDTH] IN BLOOD BY AUTOMATED COUNT: 13.6 % (ref 11.7–15.4)
GLUCOSE UR QL: NEGATIVE
HBV SURFACE AG SERPL QL IA: NEGATIVE
HCT VFR BLD AUTO: 40.5 % (ref 34–46.6)
HCV AB S/CO SERPL IA: NON REACTIVE
HGB BLD-MCNC: 13.3 G/DL (ref 11.1–15.9)
HGB UR QL STRIP: NEGATIVE
HIV 1+2 AB+HIV1 P24 AG SERPL QL IA: NON REACTIVE
IMM GRANULOCYTES # BLD: 0 X10E3/UL (ref 0–0.1)
IMM GRANULOCYTES NFR BLD: 0 %
KETONES UR QL STRIP: NEGATIVE
LEUKOCYTE ESTERASE UR QL STRIP: NEGATIVE
LYMPHOCYTES # BLD AUTO: 3 X10E3/UL (ref 0.7–3.1)
LYMPHOCYTES NFR BLD AUTO: 27 %
MCH RBC QN AUTO: 31 PG (ref 26.6–33)
MCHC RBC AUTO-ENTMCNC: 32.8 G/DL (ref 31.5–35.7)
MCV RBC AUTO: 94 FL (ref 79–97)
MICRO URNS: ABNORMAL
MICRO URNS: ABNORMAL
MONOCYTES # BLD AUTO: 0.5 X10E3/UL (ref 0.1–0.9)
MONOCYTES NFR BLD AUTO: 4 %
N GONORRHOEA RRNA SPEC QL NAA+PROBE: NEGATIVE
NEUTROPHILS # BLD AUTO: 7.6 X10E3/UL (ref 1.4–7)
NEUTROPHILS NFR BLD AUTO: 69 %
NITRITE UR QL STRIP: NEGATIVE
OTHER ANTIBIOTIC SUSC ISLT: ABNORMAL
PH UR STRIP: 6.5 [PH] (ref 5–7.5)
PLATELET # BLD AUTO: 213 X10E3/UL (ref 150–450)
PROT UR QL STRIP: NEGATIVE
RBC # BLD AUTO: 4.29 X10E6/UL (ref 3.77–5.28)
RBC #/AREA URNS HPF: ABNORMAL /HPF (ref 0–2)
RH BLD: POSITIVE
RPR SER QL: NON REACTIVE
RUBV IGG SERPL IA-ACNC: 1.31 INDEX
SL AMB INTERPRETATION: NORMAL
SP GR UR: 1.02 (ref 1–1.03)
UROBILINOGEN UR STRIP-ACNC: 0.2 MG/DL (ref 0.2–1)
WBC # BLD AUTO: 11.2 X10E3/UL (ref 3.4–10.8)
WBC #/AREA URNS HPF: ABNORMAL /HPF (ref 0–5)

## 2024-11-21 RX ORDER — NITROFURANTOIN 25; 75 MG/1; MG/1
100 CAPSULE ORAL 2 TIMES DAILY
Qty: 14 CAPSULE | Refills: 0 | Status: SHIPPED | OUTPATIENT
Start: 2024-11-21

## 2024-11-21 NOTE — TELEPHONE ENCOUNTER
Called patient in addition to mychart message, to review + UTI with initial prenatal labs and need for treatment with antibiotics. Left message for pt, encouraged to call back with any questions.

## 2024-12-02 ENCOUNTER — ROUTINE PRENATAL (OUTPATIENT)
Dept: OBGYN CLINIC | Facility: CLINIC | Age: 29
End: 2024-12-02
Payer: COMMERCIAL

## 2024-12-02 VITALS
HEIGHT: 64 IN | DIASTOLIC BLOOD PRESSURE: 74 MMHG | BODY MASS INDEX: 27.66 KG/M2 | SYSTOLIC BLOOD PRESSURE: 110 MMHG | WEIGHT: 162 LBS

## 2024-12-02 DIAGNOSIS — Z87.440 HISTORY OF UTI: ICD-10-CM

## 2024-12-02 DIAGNOSIS — Z3A.20 20 WEEKS GESTATION OF PREGNANCY: Primary | ICD-10-CM

## 2024-12-02 DIAGNOSIS — Z87.59 HISTORY OF PRECIPITOUS DELIVERY: ICD-10-CM

## 2024-12-02 LAB
EXTERNAL CHLAMYDIA SCREEN: NORMAL
EXTERNAL GONORRHEA SCREEN: NORMAL
SL AMB  POCT GLUCOSE, UA: NORMAL
SL AMB POCT URINE PROTEIN: NORMAL

## 2024-12-02 PROCEDURE — 81002 URINALYSIS NONAUTO W/O SCOPE: CPT | Performed by: OBSTETRICS & GYNECOLOGY

## 2024-12-02 PROCEDURE — PNV: Performed by: OBSTETRICS & GYNECOLOGY

## 2024-12-02 NOTE — PROGRESS NOTES
"Routine Prenatal Visit  [facility] - [address], [address], [place] [address]    Assessment/Plan:  Roshni is a 29 y.o. year old  at 20w3d who presents for routine prenatal visit.     1. 20 weeks gestation of pregnancy  Assessment & Plan:  Cultures collected. Declined MSAFP this visit as well. Has anatomy next week  Orders:  -     POCT urine dip  -     Chlamydia/GC MIKAELA, Confirmation  2. History of precipitous delivery        Subjective:   Roshni Wood is a 29 y.o.  who presents for routine prenatal care at 20w3d.  Complaints today: Denies  LOF: -; VB: -; Contractions: -; FM: +    Objective:  /74 (BP Location: Left arm, Patient Position: Sitting, Cuff Size: Standard)   Ht 5' 4\" (1.626 m)   Wt 73.5 kg (162 lb)   LMP 2024 (Exact Date)   BMI 27.81 kg/m²     General: Well appearing, no distress  Respiratory: Unlabored breathing  Abdomen: Soft, gravid, nontender  Extremities: Warm and well perfused.  Non tender.    Pregravid Weight/BMI: 65.8 kg (145 lb) (BMI 24.88)  Current Weight: 73.5 kg (162 lb)   Total Weight Gain: 7.711 kg (17 lb)     Pre- Vitals      Flowsheet Row Most Recent Value   Prenatal Assessment    Fetal Heart Rate 154   Movement Present   Prenatal Vitals    Blood Pressure 110/74   Weight - Scale 73.5 kg (162 lb)   Urine Albumin/Glucose    Dilation/Effacement/Station    Vaginal Drainage    Edema              Liza Simmons DO  2024 11:15 AM     "

## 2024-12-05 LAB
C TRACH RRNA SPEC QL NAA+PROBE: NEGATIVE
N GONORRHOEA RRNA SPEC QL NAA+PROBE: NEGATIVE

## 2024-12-10 ENCOUNTER — TELEPHONE (OUTPATIENT)
Age: 29
End: 2024-12-10

## 2024-12-10 NOTE — TELEPHONE ENCOUNTER
Patient calling to follow up with concern for LDASA. Notes she was at [facility] appointment and they did fingerstick to check iron level. Report excessive bleeding for stick site. Verbalizes concern for safety in delivery with LDASA treatment and blood thinning affect. Review LDASA therapy is stopped at 36 weeks in preparation for delivery and would be out of her system. Patient verbalizes understanding    Additionally follow up with previous request to Dr. Simmons regarding hospital forms that she would like to complete prior to admission. She understands she will folder at 28 weeks for birth cert, birth plan, and delivery consents but was hoping for the rest of forms she will need to sign upon admission to the hospital as her labors have been quick and it has been difficult to sign those forms previously.    Message to Dr. Simmons for review.

## 2024-12-11 ENCOUNTER — ROUTINE PRENATAL (OUTPATIENT)
Dept: PERINATAL CARE | Facility: OTHER | Age: 29
End: 2024-12-11
Payer: COMMERCIAL

## 2024-12-11 VITALS
HEART RATE: 92 BPM | DIASTOLIC BLOOD PRESSURE: 74 MMHG | WEIGHT: 163.2 LBS | BODY MASS INDEX: 27.86 KG/M2 | SYSTOLIC BLOOD PRESSURE: 106 MMHG | HEIGHT: 64 IN

## 2024-12-11 DIAGNOSIS — Z3A.21 21 WEEKS GESTATION OF PREGNANCY: Primary | ICD-10-CM

## 2024-12-11 DIAGNOSIS — Z36.86 ENCOUNTER FOR ANTENATAL SCREENING FOR CERVICAL LENGTH: ICD-10-CM

## 2024-12-11 DIAGNOSIS — Z36.3 ENCOUNTER FOR ANTENATAL SCREENING FOR MALFORMATION USING ULTRASOUND: ICD-10-CM

## 2024-12-11 DIAGNOSIS — N91.2 AMENORRHEA: ICD-10-CM

## 2024-12-11 DIAGNOSIS — O09.899 SHORT INTERVAL BETWEEN PREGNANCIES AFFECTING PREGNANCY, ANTEPARTUM: ICD-10-CM

## 2024-12-11 PROCEDURE — 76805 OB US >/= 14 WKS SNGL FETUS: CPT | Performed by: OBSTETRICS & GYNECOLOGY

## 2024-12-11 PROCEDURE — 76817 TRANSVAGINAL US OBSTETRIC: CPT | Performed by: OBSTETRICS & GYNECOLOGY

## 2024-12-11 PROCEDURE — 99212 OFFICE O/P EST SF 10 MIN: CPT | Performed by: OBSTETRICS & GYNECOLOGY

## 2024-12-11 NOTE — PROGRESS NOTES
Ultrasound Probe Disinfection    A transvaginal ultrasound was performed.   Prior to use, disinfection was performed with High Level Disinfection Process ([employer]).  Probe serial number [id number] was used.    Simi Gutierrez  12/11/24  2:41 PM

## 2024-12-11 NOTE — PROGRESS NOTES
The patient was seen today for an ultrasound.  Please see ultrasound report (located under Ob Procedures) for additional details.   Thank you very much for allowing us to participate in the care of this very nice patient.  Should you have any questions, please do not hesitate to contact me.     Frederick Rangel MD FACOG  Attending Physician, Maternal-Fetal Medicine  [facility]

## 2024-12-12 ENCOUNTER — TELEPHONE (OUTPATIENT)
Age: 29
End: 2024-12-12

## 2024-12-12 LAB
APPEARANCE UR: CLEAR
BACTERIA UR CULT: NORMAL
BACTERIA URNS QL MICRO: NORMAL
BILIRUB UR QL STRIP: NEGATIVE
CASTS URNS QL MICRO: NORMAL /LPF
COLOR UR: YELLOW
EPI CELLS #/AREA URNS HPF: NORMAL /HPF (ref 0–10)
GLUCOSE UR QL: NEGATIVE
HGB UR QL STRIP: NEGATIVE
KETONES UR QL STRIP: NEGATIVE
LEUKOCYTE ESTERASE UR QL STRIP: NEGATIVE
Lab: NO GROWTH
MICRO URNS: NORMAL
MICRO URNS: NORMAL
NITRITE UR QL STRIP: NEGATIVE
PH UR STRIP: 7.5 [PH] (ref 5–7.5)
PROT UR QL STRIP: NEGATIVE
RBC #/AREA URNS HPF: NORMAL /HPF (ref 0–2)
SP GR UR: 1.01 (ref 1–1.03)
UROBILINOGEN UR STRIP-ACNC: 0.2 MG/DL (ref 0.2–1)
WBC #/AREA URNS HPF: NORMAL /HPF (ref 0–5)

## 2024-12-12 NOTE — TELEPHONE ENCOUNTER
Patient calling to follow up with ultrasound visit yesterday. Noted there were comment cards on her way out that also provided the option to send an email. She is requesting the person email address listed on the card as she forgot to grab one on her way out. CTS contacted office to obtain requested information.     [email or web address]    Information provided to patient.

## 2024-12-20 ENCOUNTER — TELEPHONE (OUTPATIENT)
Dept: OBGYN CLINIC | Facility: CLINIC | Age: 29
End: 2024-12-20

## 2024-12-20 NOTE — TELEPHONE ENCOUNTER
Second Trimester call   Overall how are you doing?   Reports she is feeling good but tired, has other children at home.     Compliant with routine OB care appointments? yes    Have you completed your 1st trimester labs? yes    If you had NIPS with MFM, do you have a order for MSAFP?   Declined genetic screening and early anatomy scan    Can be completed 15w-22w9d, ideally 16w-18w    Have you seen MFM and do you have your detailed US scheduled? 12/11/2024    Pregnancy Education-have you had a chance to review the classes offered and registered? Not at this time

## 2024-12-21 PROBLEM — O23.40 UTI (URINARY TRACT INFECTION) DURING PREGNANCY: Status: RESOLVED | Noted: 2024-11-21 | Resolved: 2024-12-21

## 2025-01-02 ENCOUNTER — CLINICAL SUPPORT (OUTPATIENT)
Age: 30
End: 2025-01-02

## 2025-01-02 DIAGNOSIS — Z32.2 ENCOUNTER FOR CHILDBIRTH INSTRUCTION: Primary | ICD-10-CM

## 2025-01-03 ENCOUNTER — ROUTINE PRENATAL (OUTPATIENT)
Dept: OBGYN CLINIC | Facility: CLINIC | Age: 30
End: 2025-01-03
Payer: COMMERCIAL

## 2025-01-03 VITALS — SYSTOLIC BLOOD PRESSURE: 108 MMHG | WEIGHT: 171 LBS | BODY MASS INDEX: 29.35 KG/M2 | DIASTOLIC BLOOD PRESSURE: 68 MMHG

## 2025-01-03 DIAGNOSIS — O09.899 SHORT INTERVAL BETWEEN PREGNANCIES AFFECTING PREGNANCY, ANTEPARTUM: ICD-10-CM

## 2025-01-03 DIAGNOSIS — Z36.89 ENCOUNTER FOR OTHER SPECIFIED ANTENATAL SCREENING: ICD-10-CM

## 2025-01-03 DIAGNOSIS — Z3A.25 25 WEEKS GESTATION OF PREGNANCY: Primary | ICD-10-CM

## 2025-01-03 DIAGNOSIS — Z87.59 HISTORY OF PRECIPITOUS DELIVERY: ICD-10-CM

## 2025-01-03 LAB
SL AMB  POCT GLUCOSE, UA: NORMAL
SL AMB POCT URINE PROTEIN: NORMAL

## 2025-01-03 PROCEDURE — 81002 URINALYSIS NONAUTO W/O SCOPE: CPT | Performed by: OBSTETRICS & GYNECOLOGY

## 2025-01-03 PROCEDURE — PNV: Performed by: OBSTETRICS & GYNECOLOGY

## 2025-01-03 NOTE — PROGRESS NOTES
Routine Prenatal Visit  [facility] - [address], [address], [place] [address]    Assessment/Plan:  Roshni is a 29 y.o. year old  at 25w0d who presents for routine prenatal visit.     Assessment & Plan  25 weeks gestation of pregnancy    Orders:    POCT urine dip    Encounter for other specified  screening    Orders:    Glucose, 1H PG; Future    CBC; Future    RPR, Rfx Qn RPR/Confirm TP; Future    Short interval between pregnancies affecting pregnancy, antepartum         History of precipitous delivery           Next OB Visit 4 weeks.    Subjective:     CC: Prenatal care    Roshni Wood is a 29 y.o.  female who presents for routine prenatal care at 25w0d.  Pregnancy ROS: no leakage of fluid, pelvic pain, or vaginal bleeding.  normal fetal movement.    The following portions of the patient's history were reviewed and updated as appropriate: allergies, current medications, past family history, past medical history, obstetric history, gynecologic history, past social history, past surgical history and problem list.      Objective:  /68   Wt 77.6 kg (171 lb)   LMP 2024 (Exact Date)   BMI 29.35 kg/m²   Pregravid Weight/BMI: 65.8 kg (145 lb) (BMI 24.88)  Current Weight: 77.6 kg (171 lb)   Total Weight Gain: 11.8 kg (26 lb)   Pre- Vitals      Flowsheet Row Most Recent Value   Prenatal Assessment    Fetal Heart Rate 150   Fundal Height (cm) 25 cm   Movement Present   Prenatal Vitals    Blood Pressure 108/68   Weight - Scale 77.6 kg (171 lb)   Urine Albumin/Glucose    Dilation/Effacement/Station    Vaginal Drainage    Draining Fluid No   Edema              General: Well appearing, no distress  Abdomen: Soft, gravid, nontender  Extremities: Non tender.

## 2025-01-17 LAB
EXTERNAL HEMATOCRIT: 37.5 %
EXTERNAL HEMOGLOBIN: 12.7 G/DL
EXTERNAL HEMOGLOBIN: 12.7 G/DL
EXTERNAL PLATELET COUNT: 132 K/ΜL
EXTERNAL PLATELET COUNT: 180 K/ΜL
EXTERNAL SYPHILIS TOTAL IGG/IGM SCREENING: NORMAL

## 2025-01-18 ENCOUNTER — RESULTS FOLLOW-UP (OUTPATIENT)
Dept: LABOR AND DELIVERY | Facility: HOSPITAL | Age: 30
End: 2025-01-18

## 2025-01-18 LAB
ERYTHROCYTE [DISTWIDTH] IN BLOOD BY AUTOMATED COUNT: 12.1 % (ref 11.7–15.4)
GLUCOSE 1H P 50 G GLC PO SERPL-MCNC: 132 MG/DL (ref 70–139)
HCT VFR BLD AUTO: 37.4 % (ref 34–46.6)
HGB BLD-MCNC: 12.7 G/DL (ref 11.1–15.9)
MCH RBC QN AUTO: 32.2 PG (ref 26.6–33)
MCHC RBC AUTO-ENTMCNC: 34 G/DL (ref 31.5–35.7)
MCV RBC AUTO: 95 FL (ref 79–97)
PLATELET # BLD AUTO: 180 X10E3/UL (ref 150–450)
RBC # BLD AUTO: 3.95 X10E6/UL (ref 3.77–5.28)
RPR SER QL: NON REACTIVE
WBC # BLD AUTO: 11.4 X10E3/UL (ref 3.4–10.8)

## 2025-01-28 ENCOUNTER — ROUTINE PRENATAL (OUTPATIENT)
Dept: OBGYN CLINIC | Facility: CLINIC | Age: 30
End: 2025-01-28
Payer: COMMERCIAL

## 2025-01-28 VITALS
SYSTOLIC BLOOD PRESSURE: 112 MMHG | DIASTOLIC BLOOD PRESSURE: 76 MMHG | HEIGHT: 64 IN | BODY MASS INDEX: 29.88 KG/M2 | WEIGHT: 175 LBS

## 2025-01-28 DIAGNOSIS — Z3A.28 28 WEEKS GESTATION OF PREGNANCY: Primary | ICD-10-CM

## 2025-01-28 DIAGNOSIS — Z34.83 MULTIGRAVIDA IN THIRD TRIMESTER: ICD-10-CM

## 2025-01-28 DIAGNOSIS — O09.899 SHORT INTERVAL BETWEEN PREGNANCIES AFFECTING PREGNANCY, ANTEPARTUM: ICD-10-CM

## 2025-01-28 DIAGNOSIS — Z87.59 HISTORY OF PRECIPITOUS DELIVERY: ICD-10-CM

## 2025-01-28 LAB
SL AMB  POCT GLUCOSE, UA: NORMAL
SL AMB POCT URINE PROTEIN: NORMAL

## 2025-01-28 PROCEDURE — 81002 URINALYSIS NONAUTO W/O SCOPE: CPT | Performed by: OBSTETRICS & GYNECOLOGY

## 2025-01-28 PROCEDURE — PNV: Performed by: OBSTETRICS & GYNECOLOGY

## 2025-01-28 NOTE — PROGRESS NOTES
"Routine Prenatal Visit  [facility] - [address], [address], [place] [address]    Assessment/Plan:  Roshni is a 29 y.o. year old  at 28w4d who presents for routine prenatal visit.     1. 28 weeks gestation of pregnancy  -     POCT urine dip  2. History of precipitous delivery  3. Short interval between pregnancies affecting pregnancy, antepartum  4. Multigravida in third trimester        Subjective:     CC: Prenatal care    Roshni Wood is a 29 y.o.  female who presents for routine prenatal care at 28w4d.  Pregnancy ROS: no  leakage of fluid, pelvic pain, or vaginal bleeding.  + fetal movement.    The following portions of the patient's history were reviewed and updated as appropriate: allergies, current medications, past family history, past medical history, obstetric history, gynecologic history, past social history, past surgical history and problem list.      Objective:  /76 (BP Location: Left arm, Patient Position: Sitting, Cuff Size: Standard)   Ht 5' 4\" (1.626 m)   Wt 79.4 kg (175 lb)   LMP 2024 (Exact Date)   BMI 30.04 kg/m²   Pregravid Weight/BMI: 65.8 kg (145 lb) (BMI 24.88)  Current Weight: 79.4 kg (175 lb)   Total Weight Gain: 13.6 kg (30 lb)   Pre-Leonard Vitals    Flowsheet Row Most Recent Value   Prenatal Assessment    Fetal Heart Rate 154   Fundal Height (cm) 29 cm   Movement Present   Prenatal Vitals    Blood Pressure 112/76   Weight - Scale 79.4 kg (175 lb)   Urine Albumin/Glucose    Dilation/Effacement/Station    Vaginal Drainage    Draining Fluid No   Edema    LLE Edema None   RLE Edema None   Facial Edema None           General: Well appearing, no distress  Respiratory: Unlabored breathing  Cardiovascular: Regular rate.  Abdomen: Soft, gravid, nontender  Fundal Height: Appropriate for gestational age.  Extremities: Warm and well perfused.  Non tender.  "

## 2025-01-31 ENCOUNTER — NURSE TRIAGE (OUTPATIENT)
Age: 30
End: 2025-01-31

## 2025-01-31 NOTE — TELEPHONE ENCOUNTER
"OB patient 29w0d  having several small masses occur in left breast. Patient has been gently hand expressing every few days which has resolved the masses for a day or two before reappearing and needing to hand express again. Patient inquiring if this is safe to be doing or what provider would recommend.     Routing to on-call provider for review.     Answer Assessment - Initial Assessment Questions  1. SYMPTOM: \"What's the main symptom you're concerned about?\"  (e.g., lump, nipple discharge, pain, rash )      Masses felt on occasion in left breast. With small amount of hand expression masses disappear.   2. LOCATION: \"Where is the mass located?\"      Left breast  3. ONSET: \"When did symptoms  start?\"      A few days ago  4. PRIOR HISTORY: \"Do you have any history of prior problems with your breasts?\" (e.g., breast cancer, breast implant, fibrocystic breast disease)      denies  5. CAUSE: \"What do you think is causing this symptom?\"      Unknown, milk production  6. OTHER SYMPTOMS: \"Do you have any other symptoms?\" (e.g., fever, breast pain, nipple discharge, redness or rash)      See above  7. PREGNANCY-BREASTFEEDING: \"Is there any chance you are pregnant?\" \"When was your last menstrual period?\" \"Are you breastfeeding?\"      SANDRO 25    Protocols used: Breast Symptoms-Adult-[place]    "

## 2025-02-03 ENCOUNTER — ROUTINE PRENATAL (OUTPATIENT)
Dept: OBGYN CLINIC | Facility: CLINIC | Age: 30
End: 2025-02-03

## 2025-02-03 VITALS
WEIGHT: 176 LBS | BODY MASS INDEX: 30.05 KG/M2 | SYSTOLIC BLOOD PRESSURE: 130 MMHG | DIASTOLIC BLOOD PRESSURE: 80 MMHG | HEIGHT: 64 IN

## 2025-02-03 DIAGNOSIS — Z87.59 HISTORY OF PRECIPITOUS DELIVERY: Primary | ICD-10-CM

## 2025-02-03 DIAGNOSIS — N63.42 SUBAREOLAR MASS OF LEFT BREAST: ICD-10-CM

## 2025-02-03 PROCEDURE — PNV: Performed by: OBSTETRICS & GYNECOLOGY

## 2025-02-03 NOTE — PROGRESS NOTES
"Routine Prenatal Visit  [facility] - [address], [address], [place] [address]    Assessment/Plan:  Roshni is a 29 y.o. year old  at 29w3d who presents for routine prenatal visit.     1. History of precipitous delivery  2. Subareolar mass of left breast  -     US breast left limited (diagnostic); Future    Dw  pt  to  use warm soaks   and do not hand express at this time.  Probably related to the pregnancy and hormonal changes.  Report   fevers chill, redness and skin changes.  Will get Us of the left breast and fu in one week.     Subjective:     CC: Prenatal care    Roshni Wood is a 29 y.o.  female who presents for routine prenatal care at 29w3d.  Pregnancy ROS: no  leakage of fluid, pelvic pain, or vaginal bleeding.  + fetal movement.    The following portions of the patient's history were reviewed and updated as appropriate: allergies, current medications, past family history, past medical history, obstetric history, gynecologic history, past social history, past surgical history and problem list.  + found    3 small masses in  left  breast over the past  3-4 weeks that  keep returning.  Expresses   dc  .   No trauma to area  , changes in skin , dimpling.  Has not had this with any other pregnancies     + strong family history of breast cancer.   MGM  aunts   No UTCX     Objective:  /80 (BP Location: Right arm, Patient Position: Sitting, Cuff Size: Standard)   Ht 5' 4\" (1.626 m)   Wt 79.8 kg (176 lb)   LMP 2024 (Exact Date)   BMI 30.21 kg/m²   Pregravid Weight/BMI: 65.8 kg (145 lb) (BMI 24.88)  Current Weight: 79.8 kg (176 lb)   Total Weight Gain: 14.1 kg (31 lb)   Pre-Leonard Vitals    Flowsheet Row Most Recent Value   Prenatal Assessment    Fetal Heart Rate 144   Fundal Height (cm) 30 cm   Movement Present   Prenatal Vitals    Blood Pressure 130/80   Weight - Scale 79.8 kg (176 lb)   Urine Albumin/Glucose    Dilation/Effacement/Station    Vaginal " Drainage    Draining Fluid No   Edema    LLE Edema None   RLE Edema None   Facial Edema None           General: Well appearing, no distress  Respiratory: Unlabored breathing  Cardiovascular: Regular rate.  Abdomen: Soft, gravid, nontender  Fundal Height: Appropriate for gestational age.  Extremities: Warm and well perfused.  Non tender.  Left breast  4 oclock,   outer areola   pea size mobile x 2  nipples intact    bilateral ,  no dimpling or retracting   no dc  b/l  right  breast no masses

## 2025-02-05 ENCOUNTER — HOSPITAL ENCOUNTER (OUTPATIENT)
Dept: MAMMOGRAPHY | Facility: CLINIC | Age: 30
Discharge: HOME | End: 2025-02-05
Payer: COMMERCIAL

## 2025-02-05 DIAGNOSIS — N63.42 SUBAREOLAR MASS OF LEFT BREAST: ICD-10-CM

## 2025-02-05 PROCEDURE — 76642 ULTRASOUND BREAST LIMITED: CPT

## 2025-02-06 ENCOUNTER — RESULTS FOLLOW-UP (OUTPATIENT)
Dept: OBGYN CLINIC | Facility: CLINIC | Age: 30
End: 2025-02-06

## 2025-02-10 ENCOUNTER — ROUTINE PRENATAL (OUTPATIENT)
Dept: OBGYN CLINIC | Facility: CLINIC | Age: 30
End: 2025-02-10
Payer: COMMERCIAL

## 2025-02-10 VITALS
DIASTOLIC BLOOD PRESSURE: 72 MMHG | HEIGHT: 64 IN | BODY MASS INDEX: 30.05 KG/M2 | SYSTOLIC BLOOD PRESSURE: 122 MMHG | WEIGHT: 176 LBS

## 2025-02-10 DIAGNOSIS — O09.899 SHORT INTERVAL BETWEEN PREGNANCIES AFFECTING PREGNANCY, ANTEPARTUM: ICD-10-CM

## 2025-02-10 DIAGNOSIS — Z3A.30 30 WEEKS GESTATION OF PREGNANCY: ICD-10-CM

## 2025-02-10 DIAGNOSIS — O09.899 HISTORY OF PRETERM DELIVERY, CURRENTLY PREGNANT: Primary | ICD-10-CM

## 2025-02-10 DIAGNOSIS — Z87.59 HISTORY OF PRECIPITOUS DELIVERY: ICD-10-CM

## 2025-02-10 LAB
SL AMB  POCT GLUCOSE, UA: NORMAL
SL AMB POCT URINE PROTEIN: NORMAL

## 2025-02-10 PROCEDURE — PNV: Performed by: OBSTETRICS & GYNECOLOGY

## 2025-02-10 PROCEDURE — 81002 URINALYSIS NONAUTO W/O SCOPE: CPT | Performed by: OBSTETRICS & GYNECOLOGY

## 2025-02-10 RX ORDER — ASPIRIN 81 MG/1
162 TABLET, CHEWABLE ORAL DAILY
Qty: 60 TABLET | Refills: 2 | Status: SHIPPED | OUTPATIENT
Start: 2025-02-10

## 2025-02-10 NOTE — PROGRESS NOTES
"Routine Prenatal Visit  [facility] - [address], [address], [place] [address]    Assessment/Plan:  Roshni is a 29 y.o. year old  at 30w3d who presents for routine prenatal visit.     1. History of  delivery, currently pregnant  2. Short interval between pregnancies affecting pregnancy, antepartum  -     aspirin 81 mg chewable tablet; Chew 2 tablets (162 mg total) daily  3. History of precipitous delivery  4. 30 weeks gestation of pregnancy  Assessment & Plan:  Declined tdap today. Reviewed birth plan. Desires delayed cord clamping-- prolonged until no pulsing. Discussed that is ok however can increase risk of jaundice. Pt also desires if placenta delivers to not clamp cord. Discussed this is not recommended.   Orders:  -     POCT urine dip        Subjective:   Roshni Wood is a 29 y.o.  who presents for routine prenatal care at 30w3d.  Complaints today: Denies  LOF: -; VB: -; Contractions: -; FM: +    Objective:  /72 (BP Location: Left arm, Patient Position: Sitting)   Ht 5' 4\" (1.626 m)   Wt 79.8 kg (176 lb)   LMP 2024 (Exact Date)   BMI 30.21 kg/m²     General: Well appearing, no distress  Respiratory: Unlabored breathing  Abdomen: Soft, gravid, nontender  Extremities: Warm and well perfused.  Non tender.    Pregravid Weight/BMI: 65.8 kg (145 lb) (BMI 24.88)  Current Weight: 79.8 kg (176 lb)   Total Weight Gain: 14.1 kg (31 lb)     Pre-Leonard Vitals      Flowsheet Row Most Recent Value   Prenatal Assessment    Fetal Heart Rate 150   Fundal Height (cm) 30 cm   Movement Present   Prenatal Vitals    Blood Pressure 122/72   Weight - Scale 79.8 kg (176 lb)   Urine Albumin/Glucose    Dilation/Effacement/Station    Vaginal Drainage    Edema              Liza Simmons DO  2/10/2025 10:36 AM     " [id number] - Disposition Day Code

## 2025-02-10 NOTE — ASSESSMENT & PLAN NOTE
Declined tdap today. Reviewed birth plan. Desires delayed cord clamping-- prolonged until no pulsing. Discussed that is ok however can increase risk of jaundice. Pt also desires if placenta delivers to not clamp cord. Discussed this is not recommended.

## 2025-02-14 ENCOUNTER — TELEPHONE (OUTPATIENT)
Dept: OBGYN CLINIC | Facility: CLINIC | Age: 30
End: 2025-02-14

## 2025-02-14 NOTE — TELEPHONE ENCOUNTER
"Third Trimester call     Overall how are you feeling?   Reports overall she is feeling good but \"tired.\"    Compliant with routine OB appointments? Yes, scheduled through 36 weeks     Have you completed your 3rd trimester lab work? yes    Have you reviewed the contents of 3rd trimester folder from office?    Have you decided on a pediatrician? yes    If [facility], [facility]     If no, reviewed practices and transferred call to [phone] to set up appointment with pediatric office.   Questions on paperwork to go back to office? no   Questions on the baby birth certificate and photography forms? no      Sent link for the Hospital Readiness Video via my[employer]  "

## 2025-02-26 PROBLEM — Z3A.32 32 WEEKS GESTATION OF PREGNANCY: Status: ACTIVE | Noted: 2024-11-06

## 2025-02-26 NOTE — PROGRESS NOTES
"Routine Prenatal Visit  [facility] - [place] [address], [address], [place] [address]  Assessment & Plan  Short interval between pregnancies affecting pregnancy, antepartum         History of  delivery, currently pregnant         History of precipitous delivery         32 weeks gestation of pregnancy  - Extensive conversation regarding role of delayed cord clamping, timeline, options for pushing. Position. Encouraged patient to continue reviewing additional questions at each prenatal visit.   - PTL/PPROM/Bleeding precautions given. Kick counts reviewed.  - Third trimester labs reviewed.  - TDaP previously recommended and declined.   - Problem list updated, results console reviewed and updated with pertinent prenatal labs.  - PMH, PSH, medications reviewed and updated as needed  - Return to office in 2wk(s) for routine prenatal care  Orders:  •  POCT urine dip    Subjective:   Roshni Wood is a 29 y.o.  who presents for routine prenatal care at 32w5d.  Complaints today: None  LOF: No; VB: No; Contractions: No; FM: Present and normal    Objective:  /66 (BP Location: Right arm, Patient Position: Sitting, Cuff Size: Standard)   Ht 5' 4\" (1.626 m)   Wt 81.2 kg (179 lb)   LMP 2024 (Exact Date)   BMI 30.73 kg/m²     General: Well appearing, no distress  Respiratory: Unlabored breathing  Cardiovascular: Regular rate.  Abdomen: Soft, gravid, nontender  Extremities: Warm and well perfused.  Non tender.    Pregravid Weight/BMI: 65.8 kg (145 lb) (BMI 24.88)  Current Weight: 81.2 kg (179 lb)   Total Weight Gain: 15.4 kg (34 lb)     Pre-Leonard Vitals    Flowsheet Row Most Recent Value   Prenatal Assessment    Fetal Heart Rate 150   Fundal Height (cm) 32 cm   Movement Present   Prenatal Vitals    Blood Pressure 128/66   Weight - Scale 81.2 kg (179 lb)   Urine Albumin/Glucose    Dilation/Effacement/Station    Vaginal Drainage    Draining Fluid No   Edema    LLE Edema None   RLE " Edema None   Facial Edema None           Darrell Eldridge MD  2/27/2025 8:26 AM

## 2025-02-26 NOTE — ASSESSMENT & PLAN NOTE
- Extensive conversation regarding role of delayed cord clamping, timeline, options for pushing. Position. Encouraged patient to continue reviewing additional questions at each prenatal visit.   - PTL/PPROM/Bleeding precautions given. Kick counts reviewed.  - Third trimester labs reviewed.  - TDaP previously recommended and declined.   - Problem list updated, results console reviewed and updated with pertinent prenatal labs.  - PMH, PSH, medications reviewed and updated as needed  - Return to office in 2wk(s) for routine prenatal care  Orders:  •  POCT urine dip

## 2025-02-27 ENCOUNTER — ROUTINE PRENATAL (OUTPATIENT)
Dept: OBGYN CLINIC | Facility: CLINIC | Age: 30
End: 2025-02-27
Payer: COMMERCIAL

## 2025-02-27 VITALS
DIASTOLIC BLOOD PRESSURE: 66 MMHG | BODY MASS INDEX: 30.56 KG/M2 | WEIGHT: 179 LBS | SYSTOLIC BLOOD PRESSURE: 128 MMHG | HEIGHT: 64 IN

## 2025-02-27 DIAGNOSIS — Z3A.32 32 WEEKS GESTATION OF PREGNANCY: ICD-10-CM

## 2025-02-27 DIAGNOSIS — O09.899 HISTORY OF PRETERM DELIVERY, CURRENTLY PREGNANT: ICD-10-CM

## 2025-02-27 DIAGNOSIS — O09.899 SHORT INTERVAL BETWEEN PREGNANCIES AFFECTING PREGNANCY, ANTEPARTUM: Primary | ICD-10-CM

## 2025-02-27 DIAGNOSIS — Z87.59 HISTORY OF PRECIPITOUS DELIVERY: ICD-10-CM

## 2025-02-27 LAB
SL AMB  POCT GLUCOSE, UA: NORMAL
SL AMB POCT URINE PROTEIN: NORMAL

## 2025-02-27 PROCEDURE — 81002 URINALYSIS NONAUTO W/O SCOPE: CPT | Performed by: STUDENT IN AN ORGANIZED HEALTH CARE EDUCATION/TRAINING PROGRAM

## 2025-02-27 PROCEDURE — PNV: Performed by: STUDENT IN AN ORGANIZED HEALTH CARE EDUCATION/TRAINING PROGRAM

## 2025-03-16 PROBLEM — Z3A.35 35 WEEKS GESTATION OF PREGNANCY: Status: ACTIVE | Noted: 2024-11-06

## 2025-03-18 ENCOUNTER — ROUTINE PRENATAL (OUTPATIENT)
Dept: OBGYN CLINIC | Facility: CLINIC | Age: 30
End: 2025-03-18
Payer: COMMERCIAL

## 2025-03-18 VITALS
DIASTOLIC BLOOD PRESSURE: 66 MMHG | BODY MASS INDEX: 31.55 KG/M2 | HEIGHT: 64 IN | WEIGHT: 184.8 LBS | SYSTOLIC BLOOD PRESSURE: 130 MMHG

## 2025-03-18 DIAGNOSIS — Z3A.35 35 WEEKS GESTATION OF PREGNANCY: ICD-10-CM

## 2025-03-18 DIAGNOSIS — Z87.59 HISTORY OF PRECIPITOUS DELIVERY: ICD-10-CM

## 2025-03-18 DIAGNOSIS — O09.899 HISTORY OF PRETERM DELIVERY, CURRENTLY PREGNANT: Primary | ICD-10-CM

## 2025-03-18 LAB
SL AMB POCT COLOR,UA: NORMAL
SL AMB POCT URINE PROTEIN: NORMAL

## 2025-03-18 PROCEDURE — 81002 URINALYSIS NONAUTO W/O SCOPE: CPT | Performed by: OBSTETRICS & GYNECOLOGY

## 2025-03-18 PROCEDURE — PNV: Performed by: OBSTETRICS & GYNECOLOGY

## 2025-03-18 NOTE — ASSESSMENT & PLAN NOTE
"Patient with birth plan today at appointment.  Reviewed each point and discussed with patient there are some things that we cannot accomodate and why, ie \"no exam prior to pushing\".  Others we will evaluate safety in the situation and react accordingly, ie \"no pitocin\", \"no touching the baby\".  I made notes on birth plan w/ patient consent and had scanned to pt chart.  Encouraged her to bring copy to L&D and review preferences with staff.    Orders:    POCT urine dip    "

## 2025-03-18 NOTE — PROGRESS NOTES
"Routine Prenatal Visit  [facility] - [address], [address], [place] [address]    Assessment/Plan:  Roshni is a 29 y.o. year old  at 35w4d who presents for routine prenatal visit.     Assessment & Plan  History of  delivery, currently pregnant  Prior delivery 36 weeks.  Reviewed PTL symptoms.       History of precipitous delivery         35 weeks gestation of pregnancy  Patient with birth plan today at appointment.  Reviewed each point and discussed with patient there are some things that we cannot accomodate and why, ie \"no exam prior to pushing\".  Others we will evaluate safety in the situation and react accordingly, ie \"no pitocin\", \"no touching the baby\".  I made notes on birth plan w/ patient consent and had scanned to pt chart.  Encouraged her to bring copy to L&D and review preferences with staff.    Orders:    POCT urine dip      Next OB Visit 2 weeks.    Subjective:     CC: Prenatal care    Roshni Wood is a 29 y.o.  female who presents for routine prenatal care at 35w4d.  Pregnancy ROS: no leakage of fluid, pelvic pain, or vaginal bleeding.  normal fetal movement.    The following portions of the patient's history were reviewed and updated as appropriate: allergies, current medications, past family history, past medical history, obstetric history, gynecologic history, past social history, past surgical history and problem list.      Objective:  /66 (BP Location: Left arm, Patient Position: Sitting, Cuff Size: Standard)   Ht 5' 4\" (1.626 m)   Wt 83.8 kg (184 lb 12.8 oz)   LMP 2024 (Exact Date)   BMI 31.72 kg/m²   Pregravid Weight/BMI: 65.8 kg (145 lb) (BMI 24.88)  Current Weight: 83.8 kg (184 lb 12.8 oz)   Total Weight Gain: 18.1 kg (39 lb 12.8 oz)   Pre-Leonard Vitals      Flowsheet Row Most Recent Value   Prenatal Assessment    Fetal Heart Rate 140   Fundal Height (cm) 35 cm   Movement Present   Prenatal Vitals    Blood Pressure 130/66 "   Weight - Scale 83.8 kg (184 lb 12.8 oz)   Urine Albumin/Glucose    Dilation/Effacement/Station    Vaginal Drainage    Edema    LLE Edema None   RLE Edema None             General: Well appearing, no distress  Abdomen: Soft, gravid, nontender  Extremities: Non tender.

## 2025-03-26 ENCOUNTER — ROUTINE PRENATAL (OUTPATIENT)
Dept: OBGYN CLINIC | Facility: CLINIC | Age: 30
End: 2025-03-26
Payer: COMMERCIAL

## 2025-03-26 VITALS
WEIGHT: 183 LBS | SYSTOLIC BLOOD PRESSURE: 128 MMHG | BODY MASS INDEX: 31.24 KG/M2 | DIASTOLIC BLOOD PRESSURE: 76 MMHG | HEIGHT: 64 IN

## 2025-03-26 DIAGNOSIS — Z87.59 HISTORY OF PRECIPITOUS DELIVERY: ICD-10-CM

## 2025-03-26 DIAGNOSIS — O09.899 SHORT INTERVAL BETWEEN PREGNANCIES AFFECTING PREGNANCY, ANTEPARTUM: Primary | ICD-10-CM

## 2025-03-26 DIAGNOSIS — O09.899 HISTORY OF PRETERM DELIVERY, CURRENTLY PREGNANT: ICD-10-CM

## 2025-03-26 DIAGNOSIS — Z3A.36 36 WEEKS GESTATION OF PREGNANCY: ICD-10-CM

## 2025-03-26 DIAGNOSIS — Z36.85 ANTENATAL SCREENING FOR STREPTOCOCCUS B: ICD-10-CM

## 2025-03-26 LAB
SL AMB  POCT GLUCOSE, UA: NORMAL
SL AMB POCT URINE PROTEIN: NORMAL

## 2025-03-26 PROCEDURE — PNV: Performed by: OBSTETRICS & GYNECOLOGY

## 2025-03-26 PROCEDURE — 81002 URINALYSIS NONAUTO W/O SCOPE: CPT | Performed by: OBSTETRICS & GYNECOLOGY

## 2025-03-26 NOTE — PROGRESS NOTES
"Routine Prenatal Visit  [facility] [address], [address], [place] [address]    Assessment/Plan:  Roshni is a 29 y.o. year old  at 36w5d who presents for routine prenatal visit.     1. Short interval between pregnancies affecting pregnancy, antepartum  2. History of  delivery, currently pregnant  3. History of precipitous delivery  4. 36 weeks gestation of pregnancy  Assessment & Plan:  GBS/consent.  Orders:  -     POCT urine dip  5.  screening for streptococcus B  -     Strep B DNA probe, amplification        Subjective:     CC: Prenatal care    Roshni Wood is a 29 y.o.  female who presents for routine prenatal care at 36w5d.  Pregnancy ROS: no leakage of fluid, pelvic pain, or vaginal bleeding.  normal fetal movement.    The following portions of the patient's history were reviewed and updated as appropriate: allergies, current medications, past family history, past medical history, obstetric history, gynecologic history, past social history, past surgical history and problem list.      Objective:  /76 (BP Location: Left arm, Patient Position: Sitting, Cuff Size: Standard)   Ht 5' 4\" (1.626 m)   Wt 83 kg (183 lb)   LMP 2024 (Exact Date)   BMI 31.41 kg/m²   Pregravid Weight/BMI: 65.8 kg (145 lb) (BMI 24.88)  Current Weight: 83 kg (183 lb)   Total Weight Gain: 17.2 kg (38 lb)   Pre- Vitals      Flowsheet Row Most Recent Value   Prenatal Assessment    Fetal Heart Rate 140   Fundal Height (cm) 37 cm   Movement Present   Presentation Vertex   Prenatal Vitals    Blood Pressure 128/76   Weight - Scale 83 kg (183 lb)   Urine Albumin/Glucose    Dilation/Effacement/Station    Vaginal Drainage    Edema              General: Well appearing, no distress  Respiratory: Unlabored breathing  Cardiovascular: Regular rate.  Abdomen: Soft, gravid, nontender  Fundal Height: Appropriate for gestational age.  Extremities: Warm and well perfused.  Non " tender.

## 2025-03-28 LAB — GP B STREP DNA SPEC QL NAA+PROBE: NEGATIVE

## 2025-04-04 ENCOUNTER — ROUTINE PRENATAL (OUTPATIENT)
Dept: OBGYN CLINIC | Facility: CLINIC | Age: 30
End: 2025-04-04
Payer: COMMERCIAL

## 2025-04-04 VITALS
WEIGHT: 185 LBS | DIASTOLIC BLOOD PRESSURE: 78 MMHG | HEIGHT: 64 IN | BODY MASS INDEX: 31.58 KG/M2 | SYSTOLIC BLOOD PRESSURE: 122 MMHG

## 2025-04-04 DIAGNOSIS — O09.899 HISTORY OF PRETERM DELIVERY, CURRENTLY PREGNANT: ICD-10-CM

## 2025-04-04 DIAGNOSIS — O09.899 SHORT INTERVAL BETWEEN PREGNANCIES AFFECTING PREGNANCY, ANTEPARTUM: ICD-10-CM

## 2025-04-04 DIAGNOSIS — Z3A.38 38 WEEKS GESTATION OF PREGNANCY: Primary | ICD-10-CM

## 2025-04-04 DIAGNOSIS — Z87.59 HISTORY OF PRECIPITOUS DELIVERY: ICD-10-CM

## 2025-04-04 PROBLEM — Z3A.37 37 WEEKS GESTATION OF PREGNANCY: Status: ACTIVE | Noted: 2024-11-06

## 2025-04-04 LAB
SL AMB  POCT GLUCOSE, UA: NORMAL
SL AMB POCT URINE PROTEIN: NORMAL

## 2025-04-04 PROCEDURE — 81002 URINALYSIS NONAUTO W/O SCOPE: CPT | Performed by: OBSTETRICS & GYNECOLOGY

## 2025-04-04 PROCEDURE — PNV: Performed by: OBSTETRICS & GYNECOLOGY

## 2025-04-04 NOTE — PROGRESS NOTES
"Routine Prenatal Visit  [facility] - [address], [address], [place] [address]    Assessment/Plan:  Roshni is a 29 y.o. year old  at 38w0d who presents for routine prenatal visit.     Assessment & Plan  38 weeks gestation of pregnancy    Orders:    POCT urine dip    History of  delivery, currently pregnant         History of precipitous delivery         Short interval between pregnancies affecting pregnancy, antepartum           Next OB Visit 1 weeks.    Subjective:     CC: Prenatal care    Roshni Wood is a 29 y.o.  female who presents for routine prenatal care at 38w0d.  Pregnancy ROS: no leakage of fluid, pelvic pain, or vaginal bleeding.  normal fetal movement.    The following portions of the patient's history were reviewed and updated as appropriate: allergies, current medications, past family history, past medical history, obstetric history, gynecologic history, past social history, past surgical history and problem list.      Objective:  /78 (BP Location: Left arm, Patient Position: Sitting, Cuff Size: Standard)   Ht 5' 4\" (1.626 m)   Wt 83.9 kg (185 lb)   LMP 2024 (Exact Date)   BMI 31.76 kg/m²   Pregravid Weight/BMI: 65.8 kg (145 lb) (BMI 24.88)  Current Weight: 83.9 kg (185 lb)   Total Weight Gain: 18.1 kg (40 lb)   Pre- Vitals      Flowsheet Row Most Recent Value   Prenatal Assessment    Fetal Heart Rate 140   Fundal Height (cm) 38 cm   Movement Present   Presentation Vertex   Prenatal Vitals    Blood Pressure 122/78   Weight - Scale 83.9 kg (185 lb)   Urine Albumin/Glucose    Dilation/Effacement/Station    Vaginal Drainage    Draining Fluid No   Edema              General: Well appearing, no distress  Abdomen: Soft, gravid, nontender  Extremities: Non tender.  "

## 2025-04-08 ENCOUNTER — HOSPITAL ENCOUNTER (INPATIENT)
Facility: HOSPITAL | Age: 30
LOS: 2 days | Discharge: HOME | End: 2025-04-10
Attending: STUDENT IN AN ORGANIZED HEALTH CARE EDUCATION/TRAINING PROGRAM | Admitting: STUDENT IN AN ORGANIZED HEALTH CARE EDUCATION/TRAINING PROGRAM
Payer: COMMERCIAL

## 2025-04-08 ENCOUNTER — NURSE TRIAGE (OUTPATIENT)
Dept: OTHER | Facility: OTHER | Age: 30
End: 2025-04-08

## 2025-04-08 DIAGNOSIS — Z34.83 MULTIGRAVIDA IN THIRD TRIMESTER: Primary | ICD-10-CM

## 2025-04-08 PROCEDURE — NC001 PR NO CHARGE: Performed by: OBSTETRICS & GYNECOLOGY

## 2025-04-08 PROCEDURE — 4A1HXCZ MONITORING OF PRODUCTS OF CONCEPTION, CARDIAC RATE, EXTERNAL APPROACH: ICD-10-PCS | Performed by: OBSTETRICS & GYNECOLOGY

## 2025-04-08 RX ORDER — ONDANSETRON 2 MG/ML
4 INJECTION INTRAMUSCULAR; INTRAVENOUS EVERY 6 HOURS PRN
Status: DISCONTINUED | OUTPATIENT
Start: 2025-04-08 | End: 2025-04-09

## 2025-04-09 PROBLEM — O09.899 HISTORY OF PRETERM DELIVERY, CURRENTLY PREGNANT: Status: RESOLVED | Noted: 2024-11-06 | Resolved: 2025-04-09

## 2025-04-09 PROBLEM — Z34.83 MULTIGRAVIDA IN THIRD TRIMESTER: Status: RESOLVED | Noted: 2025-01-28 | Resolved: 2025-04-09

## 2025-04-09 PROBLEM — Z3A.38 38 WEEKS GESTATION OF PREGNANCY: Status: RESOLVED | Noted: 2024-11-06 | Resolved: 2025-04-09

## 2025-04-09 LAB
ABO GROUP BLD: NORMAL
ALBUMIN SERPL BCG-MCNC: 3.7 G/DL (ref 3.5–5)
ALP SERPL-CCNC: 89 U/L (ref 34–104)
ALT SERPL W P-5'-P-CCNC: 13 U/L (ref 7–52)
ANION GAP SERPL CALCULATED.3IONS-SCNC: 10 MMOL/L (ref 4–13)
AST SERPL W P-5'-P-CCNC: 17 U/L (ref 13–39)
BASE EXCESS BLDCOA CALC-SCNC: -3.7 MMOL/L (ref 3–11)
BASE EXCESS BLDCOV CALC-SCNC: -4.8 MMOL/L (ref 1–9)
BASOPHILS # BLD AUTO: 0.06 THOUSANDS/ÂΜL (ref 0–0.1)
BASOPHILS NFR BLD AUTO: 0 % (ref 0–1)
BILIRUB SERPL-MCNC: 0.34 MG/DL (ref 0.2–1)
BLD GP AB SCN SERPL QL: NEGATIVE
BUN SERPL-MCNC: 17 MG/DL (ref 5–25)
CALCIUM SERPL-MCNC: 9.3 MG/DL (ref 8.4–10.2)
CHLORIDE SERPL-SCNC: 104 MMOL/L (ref 96–108)
CO2 SERPL-SCNC: 20 MMOL/L (ref 21–32)
CREAT SERPL-MCNC: 0.56 MG/DL (ref 0.6–1.3)
CREAT UR-MCNC: 19.4 MG/DL
EOSINOPHIL # BLD AUTO: 0 THOUSAND/ÂΜL (ref 0–0.61)
EOSINOPHIL NFR BLD AUTO: 0 % (ref 0–6)
ERYTHROCYTE [DISTWIDTH] IN BLOOD BY AUTOMATED COUNT: 13.2 % (ref 11.6–15.1)
GFR SERPL CREATININE-BSD FRML MDRD: 124 ML/MIN/1.73SQ M
GLUCOSE SERPL-MCNC: 154 MG/DL (ref 65–140)
HCO3 BLDCOA-SCNC: 22.4 MMOL/L (ref 17.3–27.3)
HCO3 BLDCOV-SCNC: 20.6 MMOL/L (ref 12.2–28.6)
HCT VFR BLD AUTO: 38.8 % (ref 34.8–46.1)
HGB BLD-MCNC: 12.9 G/DL (ref 11.5–15.4)
HOLD SPECIMEN: NORMAL
IMM GRANULOCYTES # BLD AUTO: 0.17 THOUSAND/UL (ref 0–0.2)
IMM GRANULOCYTES NFR BLD AUTO: 1 % (ref 0–2)
LYMPHOCYTES # BLD AUTO: 0.99 THOUSANDS/ÂΜL (ref 0.6–4.47)
LYMPHOCYTES NFR BLD AUTO: 4 % (ref 14–44)
MCH RBC QN AUTO: 31.1 PG (ref 26.8–34.3)
MCHC RBC AUTO-ENTMCNC: 33.2 G/DL (ref 31.4–37.4)
MCV RBC AUTO: 94 FL (ref 82–98)
MONOCYTES # BLD AUTO: 0.79 THOUSAND/ÂΜL (ref 0.17–1.22)
MONOCYTES NFR BLD AUTO: 3 % (ref 4–12)
NEUTROPHILS # BLD AUTO: 21.85 THOUSANDS/ÂΜL (ref 1.85–7.62)
NEUTS SEG NFR BLD AUTO: 92 % (ref 43–75)
NRBC BLD AUTO-RTO: 0 /100 WBCS
O2 CT VFR BLDCOA CALC: 15 ML/DL
OXYHGB MFR BLDCOA: 66.9 %
OXYHGB MFR BLDCOV: 64.2 %
PCO2 BLDCOA: 44.2 MM[HG] (ref 30–60)
PCO2 BLDCOV: 39.6 MM HG (ref 27–43)
PH BLDCOA: 7.32 [PH] (ref 7.23–7.43)
PH BLDCOV: 7.33 [PH] (ref 7.19–7.49)
PLATELET # BLD AUTO: 196 THOUSANDS/UL (ref 149–390)
PMV BLD AUTO: 9.6 FL (ref 8.9–12.7)
PO2 BLDCOA: 31.2 MM HG (ref 5–25)
PO2 BLDCOV: 29.5 MM HG (ref 15–45)
POTASSIUM SERPL-SCNC: 3.5 MMOL/L (ref 3.5–5.3)
PROT SERPL-MCNC: 6.4 G/DL (ref 6.4–8.4)
PROT UR-MCNC: <4 MG/DL
RBC # BLD AUTO: 4.15 MILLION/UL (ref 3.81–5.12)
RH BLD: POSITIVE
SAO2 % BLDCOV: 14.2 ML/DL
SODIUM SERPL-SCNC: 134 MMOL/L (ref 135–147)
SPECIMEN EXPIRATION DATE: NORMAL
TREPONEMA PALLIDUM IGG+IGM AB [PRESENCE] IN SERUM OR PLASMA BY IMMUNOASSAY: NORMAL
WBC # BLD AUTO: 23.86 THOUSAND/UL (ref 4.31–10.16)

## 2025-04-09 PROCEDURE — 59400 OBSTETRICAL CARE: CPT | Performed by: STUDENT IN AN ORGANIZED HEALTH CARE EDUCATION/TRAINING PROGRAM

## 2025-04-09 PROCEDURE — 82570 ASSAY OF URINE CREATININE: CPT | Performed by: STUDENT IN AN ORGANIZED HEALTH CARE EDUCATION/TRAINING PROGRAM

## 2025-04-09 PROCEDURE — 82805 BLOOD GASES W/O2 SATURATION: CPT | Performed by: STUDENT IN AN ORGANIZED HEALTH CARE EDUCATION/TRAINING PROGRAM

## 2025-04-09 PROCEDURE — 86780 TREPONEMA PALLIDUM: CPT | Performed by: OBSTETRICS & GYNECOLOGY

## 2025-04-09 PROCEDURE — 80053 COMPREHEN METABOLIC PANEL: CPT | Performed by: STUDENT IN AN ORGANIZED HEALTH CARE EDUCATION/TRAINING PROGRAM

## 2025-04-09 PROCEDURE — 86850 RBC ANTIBODY SCREEN: CPT | Performed by: OBSTETRICS & GYNECOLOGY

## 2025-04-09 PROCEDURE — NC001 PR NO CHARGE: Performed by: OBSTETRICS & GYNECOLOGY

## 2025-04-09 PROCEDURE — 99215 OFFICE O/P EST HI 40 MIN: CPT

## 2025-04-09 PROCEDURE — 88307 TISSUE EXAM BY PATHOLOGIST: CPT | Performed by: STUDENT IN AN ORGANIZED HEALTH CARE EDUCATION/TRAINING PROGRAM

## 2025-04-09 PROCEDURE — 84156 ASSAY OF PROTEIN URINE: CPT | Performed by: STUDENT IN AN ORGANIZED HEALTH CARE EDUCATION/TRAINING PROGRAM

## 2025-04-09 PROCEDURE — 85025 COMPLETE CBC W/AUTO DIFF WBC: CPT | Performed by: OBSTETRICS & GYNECOLOGY

## 2025-04-09 PROCEDURE — 86901 BLOOD TYPING SEROLOGIC RH(D): CPT | Performed by: OBSTETRICS & GYNECOLOGY

## 2025-04-09 PROCEDURE — 86900 BLOOD TYPING SEROLOGIC ABO: CPT | Performed by: OBSTETRICS & GYNECOLOGY

## 2025-04-09 RX ORDER — DOCUSATE SODIUM 100 MG/1
100 CAPSULE, LIQUID FILLED ORAL 2 TIMES DAILY
Status: DISCONTINUED | OUTPATIENT
Start: 2025-04-09 | End: 2025-04-10 | Stop reason: HOSPADM

## 2025-04-09 RX ORDER — CALCIUM CARBONATE 500 MG/1
1000 TABLET, CHEWABLE ORAL DAILY PRN
Status: DISCONTINUED | OUTPATIENT
Start: 2025-04-09 | End: 2025-04-10 | Stop reason: HOSPADM

## 2025-04-09 RX ORDER — ONDANSETRON 2 MG/ML
4 INJECTION INTRAMUSCULAR; INTRAVENOUS EVERY 8 HOURS PRN
Status: DISCONTINUED | OUTPATIENT
Start: 2025-04-09 | End: 2025-04-10 | Stop reason: HOSPADM

## 2025-04-09 RX ORDER — BENZOCAINE/MENTHOL 6 MG-10 MG
1 LOZENGE MUCOUS MEMBRANE DAILY PRN
Status: DISCONTINUED | OUTPATIENT
Start: 2025-04-09 | End: 2025-04-10 | Stop reason: HOSPADM

## 2025-04-09 RX ORDER — IBUPROFEN 600 MG/1
600 TABLET, FILM COATED ORAL EVERY 6 HOURS PRN
Status: DISCONTINUED | OUTPATIENT
Start: 2025-04-09 | End: 2025-04-10 | Stop reason: HOSPADM

## 2025-04-09 RX ORDER — CALCIUM CARBONATE 500 MG/1
1000 TABLET, CHEWABLE ORAL DAILY PRN
Status: DISCONTINUED | OUTPATIENT
Start: 2025-04-09 | End: 2025-04-09

## 2025-04-09 RX ORDER — ACETAMINOPHEN 325 MG/1
650 TABLET ORAL EVERY 4 HOURS PRN
Status: DISCONTINUED | OUTPATIENT
Start: 2025-04-09 | End: 2025-04-10 | Stop reason: HOSPADM

## 2025-04-09 RX ORDER — DIPHENHYDRAMINE HCL 25 MG
25 TABLET ORAL EVERY 6 HOURS PRN
Status: DISCONTINUED | OUTPATIENT
Start: 2025-04-09 | End: 2025-04-10 | Stop reason: HOSPADM

## 2025-04-09 RX ADMIN — IBUPROFEN 600 MG: 600 TABLET, FILM COATED ORAL at 08:50

## 2025-04-09 RX ADMIN — BENZOCAINE AND LEVOMENTHOL 1 APPLICATION: 200; 5 SPRAY TOPICAL at 05:07

## 2025-04-09 RX ADMIN — WITCH HAZEL 1 PAD: 500 SOLUTION RECTAL; TOPICAL at 05:07

## 2025-04-09 RX ADMIN — IBUPROFEN 600 MG: 600 TABLET, FILM COATED ORAL at 22:36

## 2025-04-09 RX ADMIN — ACETAMINOPHEN 650 MG: 325 TABLET, FILM COATED ORAL at 22:35

## 2025-04-09 RX ADMIN — ACETAMINOPHEN 650 MG: 325 TABLET, FILM COATED ORAL at 13:14

## 2025-04-09 RX ADMIN — DOCUSATE SODIUM 100 MG: 100 CAPSULE, LIQUID FILLED ORAL at 18:17

## 2025-04-09 RX ADMIN — IBUPROFEN 600 MG: 600 TABLET, FILM COATED ORAL at 16:37

## 2025-04-09 NOTE — PROGRESS NOTES
Progress Note - OB/GYN   Name: Roshni Wood 29 y.o. female I MRN: [id number]  Unit/Bed#: -01 I Date of Admission: 2025   Date of Service: 2025 I Hospital Day: 1     Assessment & Plan  38 weeks gestation of pregnancy (Resolved: 2025)  - Admit to L&D, IVF and labs  - EFM NOVI pending  - SVE:   - GBS negative  - EFW: 3500 g by [place]  - Reviewed birth plan with patient and . Patient desires minimal intervention and no further cervical exams. She and  are open to discussion for intervention in emergency.  History of  delivery, currently pregnant (Resolved: 2025)    Multigravida in third trimester (Resolved: 2025)    Normal spontaneous vaginal delivery   Delivery day- stable   Labs tomorrow morning        Progress Note - OB/GYN  Post-Partum Physician Note   Roshni Wood 29 y.o. female MRN: [id number]  Unit/Bed#: -01 Encounter: [id number]    Patient is  post partum day 0  from an     Pain: controlled  Tolerating Oral Intake: yes  Voiding: yes  Flatus: yes  BM- no  Ambulating: yes  Breastfeeding: well  Chest Pain: no  Shortness of Breath: no  Leg Pain/Discomfort: no  Lochia: minimal      Objective:   Vitals:    25 0720   BP: 113/73   Pulse: 75   Resp: 18   Temp: 97.9 °F (36.6 °C)   SpO2: 98%       Intake/Output Summary (Last 24 hours) at 2025 0955  Last data filed at 2025 0501  Gross per 24 hour   Intake --   Output 424 ml   Net -424 ml         Labs    Recent Results (from the past 24 hours)   Protein / creatinine ratio, urine    Collection Time: 25  1:29 AM   Result Value Ref Range    Creatinine, Ur 19.4 Reference range not established. mg/dL    Protein Urine Random <4.0 Reference range not established. mg/dL    Prot/Creat Ratio, Ur     CORD, Blood gas, arterial    Collection Time: 25  3:17 AM   Result Value Ref Range    pH, Cord Art 7.323 7.230 - 7.430    pCO2, Cord Art 44.2 30.0 - 60.0    pO2, Cord Art 31.2  (H) 5.0 - 25.0 mm HG    HCO3, Cord Art 22.4 17.3 - 27.3 mmol/L    Base Exc, Cord Art -3.7 (L) 3.0 - 11.0 mmol/L    O2 Content, Cord Art 15.0 ml/dl    O2 Hgb, Arterial Cord 66.9 %   CORD, Blood gas, venous    Collection Time: 04/09/25  3:17 AM   Result Value Ref Range    pH, Cord Benjamin 7.335 7.190 - 7.490    pCO2, Cord Benjamin 39.6 27.0 - 43.0 mm HG    pO2, Cord Benjamin 29.5 15.0 - 45.0 mm HG    HCO3, Cord Benjamin 20.6 12.2 - 28.6 mmol/L    Base Exc, Cord Benjamin -4.8 (L) 1.0 - 9.0 mmol/L    O2 Cont, Cord Benjamin 14.2 mL/dL    O2 HGB,VENOUS CORD 64.2 %   Type and screen    Collection Time: 04/09/25  4:21 AM   Result Value Ref Range    ABO Grouping O     Rh Factor Positive     Antibody Screen Negative     Specimen Expiration Date 20250412    CBC and differential    Collection Time: 04/09/25  4:21 AM   Result Value Ref Range    WBC 23.86 (H) 4.31 - 10.16 Thousand/uL    RBC 4.15 3.81 - 5.12 Million/uL    Hemoglobin 12.9 11.5 - 15.4 g/dL    Hematocrit 38.8 34.8 - 46.1 %    MCV 94 82 - 98 fL    MCH 31.1 26.8 - 34.3 pg    MCHC 33.2 31.4 - 37.4 g/dL    RDW 13.2 11.6 - 15.1 %    MPV 9.6 8.9 - 12.7 fL    Platelets 196 149 - 390 Thousands/uL    nRBC 0 /100 WBCs    Segmented % 92 (H) 43 - 75 %    Immature Grans % 1 0 - 2 %    Lymphocytes % 4 (L) 14 - 44 %    Monocytes % 3 (L) 4 - 12 %    Eosinophils Relative 0 0 - 6 %    Basophils Relative 0 0 - 1 %    Absolute Neutrophils 21.85 (H) 1.85 - 7.62 Thousands/µL    Absolute Immature Grans 0.17 0.00 - 0.20 Thousand/uL    Absolute Lymphocytes 0.99 0.60 - 4.47 Thousands/µL    Absolute Monocytes 0.79 0.17 - 1.22 Thousand/µL    Eosinophils Absolute 0.00 0.00 - 0.61 Thousand/µL    Basophils Absolute 0.06 0.00 - 0.10 Thousands/µL   L&D GREEN / YELLOW ON HOLD    Collection Time: 04/09/25  4:21 AM   Result Value Ref Range    Extra Tube hold    Comprehensive metabolic panel    Collection Time: 04/09/25  4:21 AM   Result Value Ref Range    Sodium 134 (L) 135 - 147 mmol/L    Potassium 3.5 3.5 - 5.3 mmol/L     Chloride 104 96 - 108 mmol/L    CO2 20 (L) 21 - 32 mmol/L    ANION GAP 10 4 - 13 mmol/L    BUN 17 5 - 25 mg/dL    Creatinine 0.56 (L) 0.60 - 1.30 mg/dL    Glucose 154 (H) 65 - 140 mg/dL    Calcium 9.3 8.4 - 10.2 mg/dL    AST 17 13 - 39 U/L    ALT 13 7 - 52 U/L    Alkaline Phosphatase 89 34 - 104 U/L    Total Protein 6.4 6.4 - 8.4 g/dL    Albumin 3.7 3.5 - 5.0 g/dL    Total Bilirubin 0.34 0.20 - 1.00 mg/dL    eGFR 124 ml/min/1.73sq m       Physical Exam:    Constitutional:       General: She is not in acute distress.     Appearance: Normal appearance.   HENT:      Head: Normocephalic.      Nose: Nose normal.   Eyes:      General: No scleral icterus.  Cardiovascular:      Pulses: Normal pulses.   Pulmonary:      Effort: Pulmonary effort is normal. No respiratory distress.      Breath sounds: No wheezing.   Abdominal:      General: Bowel sounds are normal. There is no distension.      Palpations: Abdomen is soft.Fundus firm, below umbilicus     Tenderness: There is no abdominal tenderness. There is no guarding.   Musculoskeletal:      Cervical back: Neck supple.      Right lower leg: No edema.      Left lower leg: No edema.   Skin:     General: Skin is warm.      Capillary Refill: Capillary refill takes less than 2 seconds.   Neurological:      Mental Status: She is alert and oriented to person, place, and time.   Psychiatric:         Mood and Affect: Mood normal.      MEDS:     Current Facility-Administered Medications:     acetaminophen (TYLENOL) tablet 650 mg, Q4H PRN    benzocaine-menthol-lanolin-aloe (DERMOPLAST) 20-0.5 % topical spray 1 Application, Q6H PRN    calcium carbonate (TUMS) chewable tablet 1,000 mg, Daily PRN    diphenhydrAMINE (BENADRYL) tablet 25 mg, Q6H PRN    docusate sodium (COLACE) capsule 100 mg, BID    hydrocortisone 1 % cream 1 Application, Daily PRN    ibuprofen (MOTRIN) tablet 600 mg, Q6H PRN    ondansetron (ZOFRAN) injection 4 mg, Q8H PRN    witch hazel-glycerin (TUCKS) topical pad 1 Pad,  Q4H PRN  Invasive Devices       None

## 2025-04-09 NOTE — PLAN OF CARE
Problem: PAIN - ADULT  Goal: Verbalizes/displays adequate comfort level or baseline comfort level  Description: Interventions:- Encourage patient to monitor pain and request assistance- Assess pain using appropriate pain scale- Administer analgesics based on type and severity of pain and evaluate response- Implement non-pharmacological measures as appropriate and evaluate response- Consider cultural and social influences on pain and pain management- Notify physician/advanced practitioner if interventions unsuccessful or patient reports new pain  Outcome: Progressing     Problem: INFECTION - ADULT  Goal: Absence or prevention of progression during hospitalization  Description: INTERVENTIONS:- Assess and monitor for signs and symptoms of infection- Monitor lab/diagnostic results- Monitor all insertion sites, i.e. indwelling lines, tubes, and drains- Monitor endotracheal if appropriate and nasal secretions for changes in amount and color- [place] appropriate cooling/warming therapies per order- Administer medications as ordered- Instruct and encourage patient and family to use good hand hygiene technique- Identify and instruct in appropriate isolation precautions for identified infection/condition  Outcome: Progressing  Goal: Absence of fever/infection during neutropenic period  Description: INTERVENTIONS:- Monitor WBC  Outcome: Progressing     Problem: SAFETY ADULT  Goal: Patient will remain free of falls  Description: INTERVENTIONS:- Educate patient/family on patient safety including physical limitations- Instruct patient to call for assistance with activity - Consult OT/PT to assist with strengthening/mobility - Keep Call bell within reach- Keep bed low and locked with side rails adjusted as appropriate- Keep care items and personal belongings within reach- Initiate and maintain comfort rounds- Make Fall Risk Sign visible to staff- Offer Toileting every   Hours, in advance of need- Initiate/Maintain alarm  -  Obtain necessary fall risk management equipment:   - Apply yellow socks and bracelet for high fall risk patients- Consider moving patient to room near nurses station  Outcome: Progressing  Goal: Maintain or return to baseline ADL function  Description: INTERVENTIONS:-  Assess patient's ability to carry out ADLs; assess patient's baseline for ADL function and identify physical deficits which impact ability to perform ADLs (bathing, care of mouth/teeth, toileting, grooming, dressing, etc.)- Assess/evaluate cause of self-care deficits - Assess range of motion- Assess patient's mobility; develop plan if impaired- Assess patient's need for assistive devices and provide as appropriate- Encourage maximum independence but intervene and supervise when necessary- Involve family in performance of ADLs- Assess for home care needs following discharge - Consider OT consult to assist with ADL evaluation and planning for discharge- Provide patient education as appropriate  Outcome: Progressing     Problem: Knowledge Deficit  Goal: Patient/family/caregiver demonstrates understanding of disease process, treatment plan, medications, and discharge instructions  Description: Complete learning assessment and assess knowledge base.Interventions:- Provide teaching at level of understanding- Provide teaching via preferred learning methods  Outcome: Progressing     Problem: DISCHARGE PLANNING  Goal: Discharge to home or other facility with appropriate resources  Description: INTERVENTIONS:- Identify barriers to discharge w/patient and caregiver- Arrange for needed discharge resources and transportation as appropriate- Identify discharge learning needs (meds, wound care, etc.)- Arrange for interpretive services to assist at discharge as needed- Refer to Case Management Department for coordinating discharge planning if the patient needs post-hospital services based on physician/advanced practitioner order or complex needs related to functional  status, cognitive ability, or social support system  Outcome: Progressing     Problem: Labor & Delivery  Goal: Manages discomfort  Description: Assess and monitor for signs and symptoms of discomfort.  Assess patient's pain level regularly and per hospital policy.  Administer medications as ordered. Support use of nonpharmacological methods to help control pain such as distraction, imagery, relaxation, and application of heat and cold.  Collaborate with interdisciplinary team and patient to determine appropriate pain management plan.1. Include patient in decisions related to comfort.2. Offer non-pharmacological pain management interventions.3. Report ineffective pain management to physician.  Outcome: Progressing  Goal: Patient vital signs are stable  Description: 1. Assess vital signs - vaginal delivery.  Outcome: Progressing

## 2025-04-09 NOTE — PLAN OF CARE
Problem: PAIN - ADULT  Goal: Verbalizes/displays adequate comfort level or baseline comfort level  Description: Interventions:- Encourage patient to monitor pain and request assistance- Assess pain using appropriate pain scale- Administer analgesics based on type and severity of pain and evaluate response- Implement non-pharmacological measures as appropriate and evaluate response- Consider cultural and social influences on pain and pain management- Notify physician/advanced practitioner if interventions unsuccessful or patient reports new pain  Outcome: Progressing     Problem: INFECTION - ADULT  Goal: Absence or prevention of progression during hospitalization  Description: INTERVENTIONS:- Assess and monitor for signs and symptoms of infection- Monitor lab/diagnostic results- Monitor all insertion sites, i.e. indwelling lines, tubes, and drains- Monitor endotracheal if appropriate and nasal secretions for changes in amount and color- [place] appropriate cooling/warming therapies per order- Administer medications as ordered- Instruct and encourage patient and family to use good hand hygiene technique- Identify and instruct in appropriate isolation precautions for identified infection/condition  Outcome: Progressing  Goal: Absence of fever/infection during neutropenic period  Description: INTERVENTIONS:- Monitor WBC  Outcome: Progressing     Problem: SAFETY ADULT  Goal: Patient will remain free of falls  Description: INTERVENTIONS:- Educate patient/family on patient safety including physical limitations- Instruct patient to call for assistance with activity - Consult OT/PT to assist with strengthening/mobility - Keep Call bell within reach- Keep bed low and locked with side rails adjusted as appropriate- Keep care items and personal belongings within reach- Initiate and maintain comfort rounds- Make Fall Risk Sign visible to staff- Offer Toileting   Outcome: Progressing  Goal: Maintain or return to baseline ADL  function  Description: INTERVENTIONS:-  Assess patient's ability to carry out ADLs; assess patient's baseline for ADL function and identify physical deficits which impact ability to perform ADLs (bathing, care of mouth/teeth, toileting, grooming, dressing, etc.)- Assess/evaluate cause of self-care deficits - Assess range of motion- Assess patient's mobility; develop plan if impaired- Assess patient's need for assistive devices and provide as appropriate- Encourage maximum independence but intervene and supervise when necessary- Involve family in performance of ADLs- Assess for home care needs following discharge - Consider OT consult to assist with ADL evaluation and planning for discharge- Provide patient education as appropriate  Outcome: Progressing  Goal: Maintains/Returns to pre admission functional level  Description: INTERVENTIONS:- Perform AM-PAC 6 Click Basic Mobility/ Daily Activity assessment daily.- Set and communicate daily mobility goal to care team and patient/family/caregiver. - Collaborate with rehabilitation services on mobility goals if consulted-  Outcome: Progressing     Problem: Knowledge Deficit  Goal: Patient/family/caregiver demonstrates understanding of disease process, treatment plan, medications, and discharge instructions  Description: Complete learning assessment and assess knowledge base.Interventions:- Provide teaching at level of understanding- Provide teaching via preferred learning methods  Outcome: Progressing     Problem: DISCHARGE PLANNING  Goal: Discharge to home or other facility with appropriate resources  Description: INTERVENTIONS:- Identify barriers to discharge w/patient and caregiver- Arrange for needed discharge resources and transportation as appropriate- Identify discharge learning needs (meds, wound care, etc.)- Arrange for interpretive services to assist at discharge as needed- Refer to Case Management Department for coordinating discharge planning if the patient  needs post-hospital services based on physician/advanced practitioner order or complex needs related to functional status, cognitive ability, or social support system  Outcome: Progressing     Problem: Labor & Delivery  Goal: Manages discomfort  Description: Assess and monitor for signs and symptoms of discomfort.  Assess patient's pain level regularly and per hospital policy.  Administer medications as ordered. Support use of nonpharmacological methods to help control pain such as distraction, imagery, relaxation, and application of heat and cold.  Collaborate with interdisciplinary team and patient to determine appropriate pain management plan.1. Include patient in decisions related to comfort.2. Offer non-pharmacological pain management interventions.3. Report ineffective pain management to physician.  Outcome: Progressing  Goal: Patient vital signs are stable  Description: 1. Assess vital signs - vaginal delivery.  Outcome: Progressing

## 2025-04-09 NOTE — L&D DELIVERY NOTE
DELIVERY NOTE  Roshni Wood 29 y.o. female MRN: [id number]  Unit/Bed#: -01 Encounter: [id number]    Obstetrician:  Darrell Eldridge MD    Pre-Delivery Diagnosis:   Sam pregnancy in vertex presentation at 38w5d gestation.  Gestational hypertension vs pre-eclampsia (labs pending)    Post-Delivery Diagnosis: Same, delivered    Procedure: Spontaneous vaginal delivery    Delivery Date and Time:  4/9/2025 at 03:03    Umbilical Artery  Recent Labs     04/09/25  0317   PHCART 7.323   BECART -3.7*       Umbilical Vein  Recent Labs     04/09/25  0317   PHCVEN 7.335   BECVEN -4.8*       Apgars: 8 at 1 minute, 9 at 5 minutes    Weight: Pending           Complications: None    Description of Procedure:  Patient was found to be completely dilated. She pushed for 16 minutes to spontaneously deliver a liveborn infant in left occiput anterior position through clear fluid at 03:03. The head and shoulders delivered easily, with the body easily delivering thereafter. The infant was placed on maternal abdomen. Cord clamping was delayed, after which the cord was doubly clamped and cut. Routine cord gases and cord blood were collected. Patient declined routine pitocin for active management of the 3rd stage. Placenta delivered spontaneously and was noted to have a centrally-inserted 3-vessel cord. The placenta was sent to pathology. A thorough pelvic exam revealed no laceration. Excellent hemostasis was noted, with total QBL of 224 mL. All needle, sponge, and instrument counts were noted to be correct. The patient tolerated the procedure well and was allowed to recover in labor and delivery room.     Darrell Eldridge MD  4/9/2025 3:39 AM

## 2025-04-09 NOTE — NURSING NOTE
At this time pt has refused IV access and lab work, education provided to pt by Dr. Holliday, understanding verbalized by pt.

## 2025-04-09 NOTE — H&P
History and Physical- OB  Roshni Wood 29 y.o. female MRN: [id number]  Unit/Bed#: -01 Encounter: [id number]        ASSESSMENT/PLAN  Roshni Wood is a 29 y.o.  at 38w4d presenting with SROM, in labor.    Assessment & Plan  38 weeks gestation of pregnancy  - Admit to L&D, IVF and labs  - EFM NOVI pending  - SVE: 0  - GBS negative  - EFW: 3500 g by Leopolds  - Reviewed birth plan with patient and . Patient desires minimal intervention and no further cervical exams. She and  are open to discussion for intervention in emergency.  History of  delivery, currently pregnant    Multigravida in third trimester        She is a patient of [facility]  D/w Dr. Eldridge, on call OBGYN Attending Physician  ______________    SUBJECTIVE    SANDRO: Estimated Date of Delivery: 25    HPI:  29 y.o.  38w4d presents with contractions following leakage of fluid noted at approximately 2130. She has a history of precipitous delivery at home. Denies vaginal bleeding. Reports good fetal movement.    Prenatal labs:  Lab Results   Component Value Date    ABO O 2024    RH Positive 2024    ABS Normal 2024    HGB 12.7 2025     2025    EXTRUBELIGGQ 1.31 2024    RPR Non Reactive 2025    HEPBSAG neg 2024    HEPCAB Non Reactive 2024    HIVAGAB Non-Reactive 2024    GC neg 2024    [id number] 132 2025       Her obstetrical history is significant for      Patient Active Problem List   Diagnosis    Vitamin D deficiency    Mood swing    Fatigue    Weight loss, unintentional    History of  delivery, currently pregnant    History of precipitous delivery    Short interval between pregnancies affecting pregnancy, antepartum    38 weeks gestation of pregnancy    Multigravida in third trimester         No past medical history on file.    Past Surgical History:   Procedure Laterality Date    WISDOM  "TOOTH EXTRACTION           ROS:  Constitutional: Negative  Respiratory: Negative  Cardiovascular: Negative    Gastrointestinal: Negative     Physical Exam  General: Well appearing, no distress  Respiratory: CTAB   Cardiovascular: Regular rate  Abdomen: Soft, gravid, nontender    Extremities: Warm and well perfused.  Non tender.  SSE\" + polling + nitrazine  SVE 7/80/0, sutures palpated      OBJECTIVE:  LMP 07/13/2024 (Exact Date)   There is no height or weight on file to calculate BMI.  Labs: No results found for this or any previous visit (from the past 24 hours).      SVE:   Cervical Dilation: 7  Cervical Effacement: 80  Fetal Station: 0  Method: Manual  OB Examiner: Dr. Pena      FHT: pending - awaiting NOVI    TOCO: pending        IMAGING: AMERICA Pena MD  OB/GYN   4/8/2025  11:36 PM      Portions of the record may have been created with voice recognition software.  Occasional wrong word or \"sound a like\" substitutions may have occurred due to the inherent limitations of voice recognition software.  Read the chart carefully and recognize, using context, where substitutions have occurred  "

## 2025-04-09 NOTE — TELEPHONE ENCOUNTER
"FOLLOW UP: No follow up needed    REASON FOR CONVERSATION: Pregnancy Problem    SYMPTOMS: contractions, rupture of membranes    OTHER: N/A    DISPOSITION: Go to LD Now,  Now (overriding Go to LD Now)  Patient going to [facility] for evaluation    Reason for Disposition   Leakage of fluid from vagina  (Exception: Patient is uncertain, but thinks it might be urine incontinence.)    Answer Assessment - Initial Assessment Questions  1. ONSET: \"When did you notice the fluid coming out of your vagina?\"         2130    2. CONTRACTIONS: \"Are you having any contractions?\" If Yes, ask: \"Describe the contractions that you are having.\" (e.g., duration, frequency, regularity, severity)      Yes, has not been timing, able to breathe through them    3. SANDRO: \"What date are you expecting to deliver?\"      4/18/25    4. PARITY: \"Have you had a baby before?\" If Yes, ask: \"How long did the labor last?\"      Denies    5. FETAL MOVEMENT: \"Has the baby's movement decreased or changed significantly from normal?\"      Denies    6. OTHER SYMPTOMS: \"Do you have any other symptoms?\" (e.g., abdomen pain, fever, hand or face swelling, vaginal bleeding)      Clear leakage of fluid    Protocols used: Pregnancy - Rupture of Membranes Suspected-Adult-AH    "

## 2025-04-09 NOTE — LACTATION NOTE
This note was copied from a baby's chart.  CONSULT - LACTATION  Baby Girl Wood (Jessica) 0 days female MRN: [id number]    [facility] NURSERY Room / Bed: (N)/(N) Encounter: [id number]    Maternal Information     MOTHER:  Roshni Wood  Maternal Age: 29 y.o.  OB History: # 1 - Date: 03/06/19, Sex: Male, Weight: 2693 g (5 lb 15 oz), GA: 36w3d, Type: Vaginal, Spontaneous, Apgar1: None, Apgar5: None, Living: Living, Birth Comments: Assist with pitocin    # 2 - Date: 10/30/20, Sex: Female, Weight: 3204 g (7 lb 1 oz), GA: 38w5d, Type: Vaginal, Spontaneous, Apgar1: None, Apgar5: None, Living: Living, Birth Comments: Delivered at home-seen by Midwife after delivery    # 3 - Date: 05/01/23, Sex: Female, Weight: 3550 g (7 lb 13.2 oz), GA: 38w4d, Type: Vaginal, Spontaneous, Apgar1: 9, Apgar5: 9, Living: Living, Birth Comments: None    # 4 - Date: 04/09/25, Sex: Female, Weight: 3380 g (7 lb 7.2 oz), GA: 38w5d, Type: Vaginal, Spontaneous, Apgar1: 8, Apgar5: 9, Living: Living, Birth Comments: None   Previous breast reduction surgery? No    Lactation history:   Has patient previously breast fed: Yes   How long had patient previously breast fed: 6 months with first, 12 months with 2nd and 3rd child   Previous breast feeding complications: None     Past Surgical History:   Procedure Laterality Date    WISDOM TOOTH EXTRACTION         Birth information:  YOB: 2027   Time of birth: 3:03 AM   Sex: female   Delivery type: Vaginal, Spontaneous   Birth Weight: 3380 g (7 lb 7.2 oz)   Percent of Weight Change: 0%     Gestational Age: 38w5d   [unfilled]    Reason for Consult:    Reason for Consult: Initial assessment (ext) - 20 min, Latch Assess (ext) - 20 min    Risk Factors:         Breast and nipple assessment:   Breasts/Nipples  Left Breast: Soft  Right Breast: Soft  Left Nipple: Everted  Right Nipple: Everted  Breastfeeding Status: Yes  Breastfeeding  Progress: Not yet established    OB Lactation Tools:         Breast Pump:    Breast Pump  Pump: Personal (has pump from previous pregnancies, does not want a new one at this time)       Assessment: normal assessment    Feeding assessment: feeding well  LATCH:  Latch: Grasps breast, tongue down, lips flanged, rhythmic sucking   Audible Swallowing: Spontaneous and intermittent (24 hours old)   Type of Nipple: Everted (After stimulation)   Comfort (Breast/Nipple): Soft/non-tender   Hold (Positioning): No assist from staff, mother able to position/hold infant   LATCH Score: 10         Feeding recommendations:  breast feed on demand    Initial Consult: Met with parents to discuss feeding plan. Roshni is an experienced breastfeeding mom, she previously breast fed her other children for 6 months to 1 year. Mom reports breastfeeding is going well thus far. Mom has baby latched in cradle hold on left breast upon arrival into room. Baby actively sucking with swallows noted. Reviewed with mom proper position and alignment for deep latch.     Reviewed with parents babies bellies, milk amounts, cluster feedings and initial engorgement. Hand pump given for use when needed. Mom has a double electric breast pump at home from previous pregnancies. Declines a new one at this time. Parents deny any questions or concerns at this time. Encouraged to call for further assistance.     Feeding plan:  Family is encouraged to breastfeed on demand, every 2-3 hours or when baby showing early feeding cues. Reviewed to offer both breasts during a feed.  Discussed the importance of ensuring that baby feeds 8-12x in 24 hours and not waiting over 3 hours to feed. Educated to watch baby for hunger and fullness cues.    Education:   Education on s2s for feedings. Encouraged hand expression prior to latch. Education on baby's body alignment; belly to belly with mom; ear, shoulder hip alignment, long neck, chin / cheek touching cheek. Reviewed  how baby can breathe at the breast. Tugging sensation, no pinching/pain.    Discussed 2nd night syndrome and ways to calm infant. Hand out given. Information on hand expression given. Discussed benefits of knowing how to manually express breast including stimulating milk supply, softening nipple for latch and evacuating breast in the event of engorgement.    Discussed s/s engorgement, blocked milk ducts, and mastitis. Discussed how to remedy at home and when to contact physician. Reviewed engorgement and ways to reduce symptoms. Use a warmth on breasts with massage prior to feeding / pumping. Use massage during pumping over full ducts. Used cold, compression on breasts after feeding/pumping session. Ideas are rice in a sock. Place one in the freezer for cool and one in the microwave for warmth. Referred to discharge book / engorgement page.    Encouraged parents to call for assistance, questions, and concerns about breastfeeding.  Extension provided.  Chloe Schaeffer MA, IBCLC  4/9/2025 9:53 AM

## 2025-04-09 NOTE — ASSESSMENT & PLAN NOTE
- Admit to L&D, IVF and labs  - EFM NOVI pending  - SVE: 7/80/0  - GBS negative  - EFW: 3500 g by Leopolds  - Reviewed birth plan with patient and . Patient desires minimal intervention and no further cervical exams. She and  are open to discussion for intervention in emergency.

## 2025-04-10 VITALS
HEIGHT: 64 IN | OXYGEN SATURATION: 98 % | WEIGHT: 185 LBS | TEMPERATURE: 97.8 F | DIASTOLIC BLOOD PRESSURE: 90 MMHG | HEART RATE: 83 BPM | BODY MASS INDEX: 31.58 KG/M2 | RESPIRATION RATE: 18 BRPM | SYSTOLIC BLOOD PRESSURE: 122 MMHG

## 2025-04-10 PROBLEM — R03.0 ELEVATED BLOOD PRESSURE READING: Status: ACTIVE | Noted: 2025-04-10

## 2025-04-10 PROCEDURE — NC001 PR NO CHARGE: Performed by: OBSTETRICS & GYNECOLOGY

## 2025-04-10 PROCEDURE — 99024 POSTOP FOLLOW-UP VISIT: CPT | Performed by: OBSTETRICS & GYNECOLOGY

## 2025-04-10 RX ORDER — IBUPROFEN 600 MG/1
600 TABLET, FILM COATED ORAL EVERY 6 HOURS PRN
Qty: 30 TABLET | Refills: 0 | Status: SHIPPED | OUTPATIENT
Start: 2025-04-10

## 2025-04-10 RX ORDER — ACETAMINOPHEN 325 MG/1
650 TABLET ORAL EVERY 4 HOURS PRN
Start: 2025-04-10

## 2025-04-10 RX ADMIN — DOCUSATE SODIUM 100 MG: 100 CAPSULE, LIQUID FILLED ORAL at 08:54

## 2025-04-10 NOTE — ASSESSMENT & PLAN NOTE
Wnl mostly. No severe range. Ok for d/c home with close interval follow up in office for BP check.

## 2025-04-10 NOTE — PLAN OF CARE
Problem: PAIN - ADULT  Goal: Verbalizes/displays adequate comfort level or baseline comfort level  Description: Interventions:- Encourage patient to monitor pain and request assistance- Assess pain using appropriate pain scale- Administer analgesics based on type and severity of pain and evaluate response- Implement non-pharmacological measures as appropriate and evaluate response- Consider cultural and social influences on pain and pain management- Notify physician/advanced practitioner if interventions unsuccessful or patient reports new pain  4/10/2025 0031 by Michelle Diana RN  Outcome: Progressing  4/9/2025 2328 by Michelle Diana RN  Outcome: Progressing  4/9/2025 2326 by Michelle Diana RN  Outcome: Progressing     Problem: INFECTION - ADULT  Goal: Absence or prevention of progression during hospitalization  Description: INTERVENTIONS:- Assess and monitor for signs and symptoms of infection- Monitor lab/diagnostic results- Monitor all insertion sites, i.e. indwelling lines, tubes, and drains- Monitor endotracheal if appropriate and nasal secretions for changes in amount and color- Leona appropriate cooling/warming therapies per order- Administer medications as ordered- Instruct and encourage patient and family to use good hand hygiene technique- Identify and instruct in appropriate isolation precautions for identified infection/condition  4/10/2025 0031 by Michelle Diana RN  Outcome: Progressing  4/9/2025 2328 by Michelle Diana RN  Outcome: Progressing  4/9/2025 2326 by Michelle Diana RN  Outcome: Progressing  Goal: Absence of fever/infection during neutropenic period  Description: INTERVENTIONS:- Monitor WBC  4/10/2025 0031 by Michelle iDana RN  Outcome: Progressing  4/9/2025 2328 by Michelle Diana RN  Outcome: Progressing  4/9/2025 2326 by Michelle Diana RN  Outcome: Progressing     Problem: SAFETY ADULT  Goal: Patient will remain free of falls  Description: INTERVENTIONS:- Educate patient/family on patient  safety including physical limitations- Instruct patient to call for assistance with activity - Consult OT/PT to assist with strengthening/mobility - Keep Call bell within reach- Keep bed low and locked with side rails adjusted as appropriate- Keep care items and personal belongings within reach- Initiate and maintain comfort rounds-  4/10/2025 0031 by Michelle Diana RN  Outcome: Progressing  4/9/2025 2328 by Michelle Diana RN  Outcome: Progressing  4/9/2025 2326 by Michelle Diana RN  Outcome: Progressing  Goal: Maintain or return to baseline ADL function  Description: INTERVENTIONS:-  Assess patient's ability to carry out ADLs; assess patient's baseline for ADL function and identify physical deficits which impact ability to perform ADLs (bathing, care of mouth/teeth, toileting, grooming, dressing, etc.)- Assess/evaluate cause of self-care deficits - Assess range of motion- Assess patient's mobility; develop plan if impaired- Assess patient's need for assistive devices and provide as appropriate- Encourage maximum independence but intervene and supervise when necessary- Involve family in performance of ADLs- Assess for home care needs following discharge - Consider OT consult to assist with ADL evaluation and planning for discharge- Provide patient education as appropriate  4/10/2025 0031 by Michelle Diana RN  Outcome: Progressing  4/9/2025 2328 by Michelle Diana RN  Outcome: Progressing  4/9/2025 2326 by Michelle Diana RN  Outcome: Progressing  Goal: Maintains/Returns to pre admission functional level  Description: INTERVENTIONS:- Perform AM-PAC 6 Click Basic Mobility/ Daily Activity assessment daily.- Set and communicate daily mobility goal to care team and patient/family/caregiver. -  4/10/2025 0031 by Michelle Diana RN  Outcome: Progressing  4/9/2025 2328 by Michelle Diana RN  Outcome: Progressing  4/9/2025 2326 by Michelle Diana RN  Outcome: Progressing     Problem: Knowledge Deficit  Goal: Patient/family/caregiver  demonstrates understanding of disease process, treatment plan, medications, and discharge instructions  Description: Complete learning assessment and assess knowledge base.Interventions:- Provide teaching at level of understanding- Provide teaching via preferred learning methods  4/10/2025 0031 by Michelle Diana RN  Outcome: Progressing  4/9/2025 2328 by Michelle Diana RN  Outcome: Progressing  4/9/2025 2326 by Michelle Diana RN  Outcome: Progressing     Problem: DISCHARGE PLANNING  Goal: Discharge to home or other facility with appropriate resources  Description: INTERVENTIONS:- Identify barriers to discharge w/patient and caregiver- Arrange for needed discharge resources and transportation as appropriate- Identify discharge learning needs (meds, wound care, etc.)- Arrange for interpretive services to assist at discharge as needed- Refer to Case Management Department for coordinating discharge planning if the patient needs post-hospital services based on physician/advanced practitioner order or complex needs related to functional status, cognitive ability, or social support system  4/10/2025 0031 by Michelle Diana RN  Outcome: Progressing  4/9/2025 2328 by Michelle Diana RN  Outcome: Progressing  4/9/2025 2326 by Michelle Diana RN  Outcome: Progressing     Problem: Labor & Delivery  Goal: Manages discomfort  Description: Assess and monitor for signs and symptoms of discomfort.  Assess patient's pain level regularly and per hospital policy.  Administer medications as ordered. Support use of nonpharmacological methods to help control pain such as distraction, imagery, relaxation, and application of heat and cold.  Collaborate with interdisciplinary team and patient to determine appropriate pain management plan.1. Include patient in decisions related to comfort.2. Offer non-pharmacological pain management interventions.3. Report ineffective pain management to physician.  4/10/2025 0031 by Michelle Diana RN  Outcome:  Progressing  4/9/2025 2328 by Michelle Diana RN  Outcome: Progressing  4/9/2025 2326 by Michelle Diana RN  Outcome: Progressing  Goal: Patient vital signs are stable  Description: 1. Assess vital signs - vaginal delivery.  4/10/2025 0031 by Michelle Diana RN  Outcome: Progressing  4/9/2025 2328 by Michelle Diana RN  Outcome: Progressing  4/9/2025 2326 by Michelle Diana RN  Outcome: Progressing

## 2025-04-10 NOTE — PLAN OF CARE
Problem: PAIN - ADULT  Goal: Verbalizes/displays adequate comfort level or baseline comfort level  Description: Interventions:- Encourage patient to monitor pain and request assistance- Assess pain using appropriate pain scale- Administer analgesics based on type and severity of pain and evaluate response- Implement non-pharmacological measures as appropriate and evaluate response- Consider cultural and social influences on pain and pain management- Notify physician/advanced practitioner if interventions unsuccessful or patient reports new pain  4/10/2025 1048 by Christina Krueger RN  Outcome: Completed  4/10/2025 0729 by Christina Krueger RN  Outcome: Progressing     Problem: INFECTION - ADULT  Goal: Absence or prevention of progression during hospitalization  Description: INTERVENTIONS:- Assess and monitor for signs and symptoms of infection- Monitor lab/diagnostic results- Monitor all insertion sites, i.e. indwelling lines, tubes, and drains- Monitor endotracheal if appropriate and nasal secretions for changes in amount and color- [place] appropriate cooling/warming therapies per order- Administer medications as ordered- Instruct and encourage patient and family to use good hand hygiene technique- Identify and instruct in appropriate isolation precautions for identified infection/condition  4/10/2025 1048 by Christina Krueger RN  Outcome: Completed  4/10/2025 0729 by Christina Krueger RN  Outcome: Progressing  Goal: Absence of fever/infection during neutropenic period  Description: INTERVENTIONS:- Monitor WBC  4/10/2025 1048 by Christina Krueger RN  Outcome: Completed  4/10/2025 0729 by Christina Krueger RN  Outcome: Progressing     Problem: SAFETY ADULT  Goal: Patient will remain free of falls  Description: INTERVENTIONS:- Educate patient/family on patient safety including physical limitations- Instruct patient to call for assistance with activity - Consult OT/PT to assist with strengthening/mobility - Keep Call bell within  reach- Keep bed low and locked with side rails adjusted as appropriate- Keep care items and personal belongings within reach- Initiate and maintain comfort rounds- Make Fall Risk Sign visible to staffand bracelet for high fall risk patients- Consider moving patient to room near nurses station  4/10/2025 1048 by Christina Krueger RN  Outcome: Completed  4/10/2025 0729 by Christina Krueger RN  Outcome: Progressing  Goal: Maintain or return to baseline ADL function  Description: INTERVENTIONS:-  Assess patient's ability to carry out ADLs; assess patient's baseline for ADL function and identify physical deficits which impact ability to perform ADLs (bathing, care of mouth/teeth, toileting, grooming, dressing, etc.)- Assess/evaluate cause of self-care deficits - Assess range of motion- Assess patient's mobility; develop plan if impaired- Assess patient's need for assistive devices and provide as appropriate- Encourage maximum independence but intervene and supervise when necessary- Involve family in performance of ADLs- Assess for home care needs following discharge - Consider OT consult to assist with ADL evaluation and planning for discharge- Provide patient education as appropriate  4/10/2025 1048 by Christina Krueger RN  Outcome: Completed  4/10/2025 0729 by Christina Krueger RN  Outcome: Progressing  Goal: Maintains/Returns to pre admission functional level  Description: INTERVENTIONS:- Perform AM-PAC 6 Click Basic Mobility/ Daily Activity assessment daily.- Set and communicate daily mobility goal to care team and patient/family/caregiver. - Collaborate with rehabilitation services on mobility goals if consulted- Perform - Record patient progress and toleration of activity level   4/10/2025 1048 by Christina Krueger RN  Outcome: Completed  4/10/2025 0729 by Christina Krueger RN  Outcome: Progressing     Problem: Knowledge Deficit  Goal: Patient/family/caregiver demonstrates understanding of disease process, treatment plan,  medications, and discharge instructions  Description: Complete learning assessment and assess knowledge base.Interventions:- Provide teaching at level of understanding- Provide teaching via preferred learning methods  4/10/2025 1048 by Christina Krueger RN  Outcome: Completed  4/10/2025 0729 by Christina Krueger RN  Outcome: Progressing     Problem: DISCHARGE PLANNING  Goal: Discharge to home or other facility with appropriate resources  Description: INTERVENTIONS:- Identify barriers to discharge w/patient and caregiver- Arrange for needed discharge resources and transportation as appropriate- Identify discharge learning needs (meds, wound care, etc.)- Arrange for interpretive services to assist at discharge as needed- Refer to Case Management Department for coordinating discharge planning if the patient needs post-hospital services based on physician/advanced practitioner order or complex needs related to functional status, cognitive ability, or social support system  4/10/2025 1048 by Christina Krueger RN  Outcome: Completed  4/10/2025 0729 by Christina Krueger RN  Outcome: Progressing     Problem: Labor & Delivery  Goal: Manages discomfort  Description: Assess and monitor for signs and symptoms of discomfort.  Assess patient's pain level regularly and per hospital policy.  Administer medications as ordered. Support use of nonpharmacological methods to help control pain such as distraction, imagery, relaxation, and application of heat and cold.  Collaborate with interdisciplinary team and patient to determine appropriate pain management plan.1. Include patient in decisions related to comfort.2. Offer non-pharmacological pain management interventions.3. Report ineffective pain management to physician.  4/10/2025 1048 by Christina Krueger RN  Outcome: Completed  4/10/2025 0729 by Christina Krueger RN  Outcome: Progressing  Goal: Patient vital signs are stable  Description: 1. Assess vital signs - vaginal delivery.  4/10/2025 1048 by  Christina Krueger, RN  Outcome: Completed  4/10/2025 0729 by Christina Krueger, RN  Outcome: Progressing

## 2025-04-10 NOTE — PROGRESS NOTES
Progress Note - OB/GYN   Name: Roshni Wood 29 y.o. female I MRN: [id number]  Unit/Bed#: -01 I Date of Admission: 4/8/2025   Date of Service: 4/10/2025 I Hospital Day: 2    Assessment & Plan  Normal spontaneous vaginal delivery  D/c home      Elevated blood pressure reading  Wnl mostly. No severe range. Ok for d/c home with close interval follow up in office for BP check.    OB Post-Partum Progress Note  Subjective   Post delivery. Patient is doing well. Lochia WNL. Pain well controlled. She reports no complaints.     Pain: yes, cramping, improved with meds  Tolerating PO: yes  Voiding: +  Flatus: +  BM: -  Ambulating: yes  Breastfeeding:  +  Chest pain: no  Shortness of breath: no  Leg pain: no  Lochia: WNL    Objective :  Temp:  [97.8 °F (36.6 °C)-98.7 °F (37.1 °C)] 97.8 °F (36.6 °C)  HR:  [80-98] 83  BP: (122-125)/(82-90) 122/90  Resp:  [18] 18  SpO2:  [98 %] 98 %  O2 Device: None (Room air)    Physical Exam  Constitutional:       Appearance: Normal appearance.   HENT:      Head: Normocephalic and atraumatic.   Pulmonary:      Effort: Pulmonary effort is normal.   Abdominal:      General: Abdomen is flat.      Palpations: Abdomen is soft.   Genitourinary:     Comments: Fundus firm  Skin:     General: Skin is warm and dry.   Neurological:      Mental Status: She is alert and oriented to person, place, and time.   Psychiatric:         Mood and Affect: Mood normal.         Behavior: Behavior normal.         Judgment: Judgment normal.           Lab Results: I have reviewed the following results:  Lab Results   Component Value Date    WBC 23.86 (H) 04/09/2025    HGB 12.9 04/09/2025    HCT 38.8 04/09/2025    MCV 94 04/09/2025     04/09/2025

## 2025-04-10 NOTE — DISCHARGE SUMMARY
Discharge Summary - OB/GYN   Name: Roshni Wood 29 y.o. female I MRN: [id number]  Unit/Bed#: -01 I Date of Admission: 2025   Date of Service: 4/10/2025 I Hospital Day: 2    ADMISSION  Patient of: [facility] [place]  Admission Date: 2025   Admitting Attending: Dr. Darrell Eldridge MD  Admitting Diagnoses:   Patient Active Problem List   Diagnosis    Vitamin D deficiency    Mood swing    Fatigue    Weight loss, unintentional    History of precipitous delivery    Short interval between pregnancies affecting pregnancy, antepartum    Normal spontaneous vaginal delivery    Elevated blood pressure reading       DELIVERY  Delivery Method: Vaginal, Spontaneous   Delivery Date and Time: 2025 3:03 AM  Delivery Attending: Darrell Eldridge    DISCHARGE  Discharge Date: 4/10/25  Discharge Attending: Dr. Darrell Eldridge MD  Discharge Diagnosis:   Same, Delivered    Clinical course: Admission to Delivery  Roshni Wood is a 29 y.o.  who was admitted at 38w5d for labor.  Pt was in spontaneous labor and managed expectantly.    For pain control in labor, pt received no medications.  The labor course was complicated by elevated Bps.  She progressed to complete and began pushing.    Delivery  Route of Delivery: Vaginal, Spontaneous    Anesthesia: None,   QBL: Non-Surgical QBL (mL): 224      QBL:        Delivery: Vaginal, Spontaneous at 2025 3:03 AM  Laceration: Perineal:   Repaired?   No lacerations    Baby's Weight: 3380 g (7 lb 7.2 oz); 119.22    Apgar scores: 8  and 9  at 1 and 5 minutes, respectively    Clinical Course: Post-Delivery:  The post delivery course was unremarkable.    On the day of discharge, the patient was ambulating, voiding spontaneously, tolerating oral intake, and hemodynamically stable. She was able to reasonably perform all ADLs. She had appropriate bowel function. Pain was well-controlled. She was discharged home on postpartum/postop day #1 without  complications. Her blood pressures remained normal, mildly elevated. Pt had no symptoms. Patient was instructed to follow up with her OB as an outpatient and was given appropriate warnings to call her provider with problems or concerns.    Pertinent lab findings included:   Blood type O+.     Last three Hgb values:  Lab Results   Component Value Date    HGB 12.9 04/09/2025    HGB 12.7 01/17/2025    HGB 12.7 01/17/2025    HGB 12.7 01/17/2025        Problem-specific follow-up plans included the following:  Assessment & Plan  Normal spontaneous vaginal delivery  D/c home      Elevated blood pressure reading  Wnl mostly. No severe range. Ok for d/c home with close interval follow up in office for BP check.      Discharge med list:  Contraception: TBD at PP visit     Medication List      START taking these medications     acetaminophen 325 mg tablet; Commonly known as: TYLENOL; Take 2 tablets   (650 mg total) by mouth every 4 (four) hours as needed for mild pain   ibuprofen 600 mg tablet; Commonly known as: MOTRIN; Take 1 tablet (600   mg total) by mouth every 6 (six) hours as needed for mild pain     CONTINUE taking these medications     cholecalciferol 1,000 units tablet; Commonly known as: VITAMIN D3   ferrous gluconate 324 mg tablet; Commonly known as: FERGON   PRENATAL VITAMIN PO     STOP taking these medications     aspirin 81 mg chewable tablet       Condition at discharge:   good     Disposition:   Home    Planned Readmission:   No    Discharge Statement:  I have spent a total time of 20 minutes in caring for this patient on the day of the visit/encounter. .

## 2025-04-10 NOTE — LACTATION NOTE
This note was copied from a baby's chart.  CONSULT - LACTATION  Baby Girl Wood (Jessica) 1 days female MRN: [id number]    [facility] NURSERY Room / Bed: (N)/(N) Encounter: [id number]    Maternal Information     MOTHER:  Roshni Wood  Maternal Age: 29 y.o.  OB History: # 1 - Date: [birth year]19, Sex: Male, Weight: 2693 g (5 lb 15 oz), GA: 36w3d, Type: Vaginal, Spontaneous, Apgar1: None, Apgar5: None, Living: Living, Birth Comments: Assist with pitocin    # 2 - Date: 10/30/20, Sex: Female, Weight: 3204 g (7 lb 1 oz), GA: 38w5d, Type: Vaginal, Spontaneous, Apgar1: None, Apgar5: None, Living: Living, Birth Comments: Delivered at home-seen by Midwife after delivery    # 3 - Date: 05/01/23, Sex: Female, Weight: 3550 g (7 lb 13.2 oz), GA: 38w4d, Type: Vaginal, Spontaneous, Apgar1: 9, Apgar5: 9, Living: Living, Birth Comments: None    # 4 - Date: 04/09/25, Sex: Female, Weight: 3380 g (7 lb 7.2 oz), GA: 38w5d, Type: Vaginal, Spontaneous, Apgar1: 8, Apgar5: 9, Living: Living, Birth Comments: None   Previous breast reduction surgery? No    Lactation history:   Has patient previously breast fed: Yes   How long had patient previously breast fed: 6 months with 1st then 12 months with 2nd & 3rd child   Previous breast feeding complications: None     Past Surgical History:   Procedure Laterality Date    WISDOM TOOTH EXTRACTION         Birth information:  YOB: 2027   Time of birth: 3:03 AM   Sex: female   Delivery type: Vaginal, Spontaneous   Birth Weight: 3380 g (7 lb 7.2 oz)   Percent of Weight Change: -3%     Gestational Age: 38w5d   [unfilled]    Reason for Consult:    Reason for Consult: Follow-up assessment (routine) -10 min    Risk Factors:         Breast and nipple assessment:   Breasts/Nipples  Left Breast: Soft  Right Breast: Soft  Left Nipple: Everted  Right Nipple: Everted  Intervention: Other (comment) (Review of good latch/feed & support  available)  Breastfeeding Status: Yes  Breastfeeding Progress: Not yet established    OB Lactation Tools:         Breast Pump:    Breast Pump  Pump: Personal (denies need for New breast pump)  Pump Review/Education: Milk storage      [place] Assessment: sleepy    Feeding assessment: feeding well as per Experienced Mom    LATCH:  Latch: Grasps breast, tongue down, lips flanged, rhythmic sucking   Audible Swallowing: Spontaneous and intermittent (24 hours old)   Type of Nipple: Everted (After stimulation)   Comfort (Breast/Nipple): Soft/non-tender   Hold (Positioning): No assist from staff, mother able to position/hold infant   LATCH Score: 10       [place]:                   Feeding recommendations:  breast feed on demand         04/10/25 1045   Patient Follow-Up   Lactation Consult Status 2   Follow-Up Type Inpatient;Call as needed   Other OB Lactation Documentation    Additional Problem Noted Follow up Visit with experienced Family; states nursing well; denies need for assistance  (Encouraged Lactation support)     Verbal review of positioning and alignment. Mom is encouraged to:     - Bring baby up to the breast (use of pillows to elevate so baby's torso is against mom's breasts)   - Skin to skin for feedings with top hand exposed to show signs of satiation   - Chin deep into breast tissue (make baby look up to the nipple)   - nose aligned to the nipple   -Wait for wide gape, place chin behind the areola on the breast so nipple is at the nose. Once baby opens their mouth wide, the nipple will be aimed at the upper, back palate  - Cheeks should be touching breast, and baby should have a long neck   - Ear, shoulder, hip will be in alignment   - Deep, snug hold of baby up to the breast   - Every baby knows how to breathe at the breast. The tip of the nose may appear to touch the breast. Babies breathe from the side of the nasal passages. If baby can not breathe due to improper alignment, baby will  unlatch      Encouraged parents to call for assistance, questions, and concerns about breastfeeding.        Cristal Shoemaker RN 4/10/2025 12:18 PM

## 2025-04-10 NOTE — PROGRESS NOTES
Patient does not want a CBC drawn at current time, she is feeling fine and bleeding is minimal. Dr Simmons notified.

## 2025-04-10 NOTE — PLAN OF CARE
Problem: PAIN - ADULT  Goal: Verbalizes/displays adequate comfort level or baseline comfort level  Description: Interventions:- Encourage patient to monitor pain and request assistance- Assess pain using appropriate pain scale- Administer analgesics based on type and severity of pain and evaluate response- Implement non-pharmacological measures as appropriate and evaluate response- Consider cultural and social influences on pain and pain management- Notify physician/advanced practitioner if interventions unsuccessful or patient reports new pain  Outcome: Progressing     Problem: INFECTION - ADULT  Goal: Absence or prevention of progression during hospitalization  Description: INTERVENTIONS:- Assess and monitor for signs and symptoms of infection- Monitor lab/diagnostic results- Monitor all insertion sites, i.e. indwelling lines, tubes, and drains- Monitor endotracheal if appropriate and nasal secretions for changes in amount and color- [place] appropriate cooling/warming therapies per order- Administer medications as ordered- Instruct and encourage patient and family to use good hand hygiene technique- Identify and instruct in appropriate isolation precautions for identified infection/condition  Outcome: Progressing  Goal: Absence of fever/infection during neutropenic period  Description: INTERVENTIONS:- Monitor WBC  Outcome: Progressing     Problem: SAFETY ADULT  Goal: Patient will remain free of falls  Description: INTERVENTIONS:- Educate patient/family on patient safety including physical limitations- Instruct patient to call for assistance with activity - Consult OT/PT to assist with strengthening/mobility - Keep Call bell within reach- Keep bed low and locked with side rails adjusted as appropriate- Keep care items   Outcome: Progressing  Goal: Maintain or return to baseline ADL function  Description: INTERVENTIONS:-  Assess patient's ability to carry out ADLs; assess patient's baseline for ADL function and  identify physical deficits which impact ability to perform ADLs (bathing, care of mouth/teeth, toileting, grooming, dressing, etc.)- Assess/evaluate cause of self-care deficits - Assess range of motion- Assess patient's mobility; develop plan if impaired- Assess patient's need for assistive devices and provide as appropriate- Encourage maximum independence but intervene and supervise when necessary- Involve family in performance of ADLs- Assess for home care needs following discharge - Consider OT consult to assist with ADL evaluation and planning for discharge- Provide patient education as appropriate  Outcome: Progressing  Goal: Maintains/Returns to pre admission functional level  Description: INTERVENTIONS:- Perform AM-PAC 6 Click Basic Mobility/ Daily Activity assessment daily.- Set and communicate daily mobility goal to care team and patient/family/caregiver. - Collaborate with rehabilitation services on mobility goals if consulted- Perform ROutcome: Progressing     Problem: Knowledge Deficit  Goal: Patient/family/caregiver demonstrates understanding of disease process, treatment plan, medications, and discharge instructions  Description: Complete learning assessment and assess knowledge base.Interventions:- Provide teaching at level of understanding- Provide teaching via preferred learning methods  Outcome: Progressing     Problem: DISCHARGE PLANNING  Goal: Discharge to home or other facility with appropriate resources  Description: INTERVENTIONS:- Identify barriers to discharge w/patient and caregiver- Arrange for needed discharge resources and transportation as appropriate- Identify discharge learning needs (meds, wound care, etc.)- Arrange for interpretive services to assist at discharge as needed- Refer to Case Management Department for coordinating discharge planning if the patient needs post-hospital services based on physician/advanced practitioner order or complex needs related to functional status,  cognitive ability, or social support system  Outcome: Progressing     Problem: Labor & Delivery  Goal: Manages discomfort  Description: Assess and monitor for signs and symptoms of discomfort.  Assess patient's pain level regularly and per hospital policy.  Administer medications as ordered. Support use of nonpharmacological methods to help control pain such as distraction, imagery, relaxation, and application of heat and cold.  Collaborate with interdisciplinary team and patient to determine appropriate pain management plan.1. Include patient in decisions related to comfort.2. Offer non-pharmacological pain management interventions.3. Report ineffective pain management to physician.  Outcome: Progressing  Goal: Patient vital signs are stable  Description: 1. Assess vital signs - vaginal delivery.  Outcome: Progressing

## 2025-04-11 PROCEDURE — 88307 TISSUE EXAM BY PATHOLOGIST: CPT | Performed by: STUDENT IN AN ORGANIZED HEALTH CARE EDUCATION/TRAINING PROGRAM

## 2025-04-11 NOTE — UTILIZATION REVIEW
"  MOTHER AND BABY DISCHARGED APRIL 10    NOTIFICATION OF INPATIENT ADMISSION   MATERNITY/DELIVERY AUTHORIZATION REQUEST   SERVICING FACILITY:   [facility] - L&D, , NICU  [address] [facility] [place] [address]  Tax ID: [id number]  NPI: [id number] ATTENDING PROVIDER:  Attending Name and NPI#: Darrell Eldridge Md [[id number]]  [address] [address] [place] [address] [address]  Phone: [phone]     ADMISSION INFORMATION:  Place of Service: Inpatient [facility]  Place of Service Code: 21  Inpatient Admission Date/Time: 25 11:40 PM  Discharge Date/Time: 4/10/2025 12:00 PM  Admitting Diagnosis Code/Description:  Full-term premature rupture of membranes [O42.92]     Mother: Roshni Wood 1995 Estimated Date of Delivery: 25  Delivering clinician: Darerll Eldridge   OB History          4    Para   4    Term   3       1    AB        Living   4         SAB        IAB        Ectopic        Multiple   0    Live Births   4               [place] Name & MRN:   Information for the patient's :  Nina, Baby Girl (Roshni) [[id number]]    Delivery Information:  Sex: female  Delivered 2025 3:03 AM by Vaginal, Spontaneous; Gestational Age: 38w5d     Measurements:  Weight: 7 lb 7.2 oz (3380 g);  Height: 19.88\"    APGAR 1 minute 5 minutes 10 minutes   Totals: 8 9       UTILIZATION REVIEW CONTACT:  Irma Altamirano, Utilization   Network Utilization Review Department  Phone: [phone]  Fax [phone]  Email: [email or web address]  Contact for approvals/pending authorizations, clinical reviews, and discharge.     PHYSICIAN ADVISORY SERVICES:  Medical Necessity Denial & Vjsp-vc-Uhca Review  Phone: [phone]  Fax: [phone]  Email: [email or web address]     DISCHARGE SUPPORT TEAM:  For Patients Discharge Needs & Updates  Phone: [phone] opt. 2 Fax: " [phone]  Email: [email or web address]      NOTIFICATION OF ADMISSION DISCHARGE   This is a Notification of Discharge from [facility]. Please be advised that this patient has been discharge from our facility. Below you will find the admission and discharge date and time including the patient’s disposition.   UTILIZATION REVIEW CONTACT:  Utilization Review Assistants  Network Utilization Review Department  Phone: [phone] x carefully listen to the prompts. All voicemails are confidential.  Email: [email or web address]     ADMISSION INFORMATION  PRESENTATION DATE: 4/8/2025 10:49 PM  OBERVATION ADMISSION DATE: N/A  INPATIENT ADMISSION DATE: 4/8/25 11:40 PM   DISCHARGE DATE: 4/10/2025 12:00 PM   DISPOSITION:Home/Self Care    [facility] Utilization Review Department  ATTENTION: Please call with any questions or concerns to [phone] and carefully listen to the prompts so that you are directed to the right person. All voicemails are confidential.   For Discharge needs, contact Care Management DC Support Team at [phone] opt. 2  Send all requests for admission clinical reviews, approved or denied determinations and any other requests to dedicated fax number below belonging to the campus where the patient is receiving treatment. List of dedicated fax numbers for the Facilities:  FACILITY NAME UR FAX NUMBER   ADMISSION DENIALS (Administrative/Medical Necessity) [phone]   DISCHARGE SUPPORT TEAM ([facility]) [phone]   PARENT CHILD HEALTH (Maternity/NICU/Pediatrics) [phone]   [facility] [phone]   [facility] [phone]   [facility] [phone]   [facility] [phone]   [facility] [phone]   [facility] [phone]   [facility] [phone]   [facility]  [facility] [phone]   [facility] [phone]   [facility] [phone]   [facility] [phone]   [facility] [phone]

## 2025-04-18 ENCOUNTER — RESULTS FOLLOW-UP (OUTPATIENT)
Dept: OBGYN CLINIC | Facility: CLINIC | Age: 30
End: 2025-04-18

## 2025-04-21 ENCOUNTER — TELEPHONE (OUTPATIENT)
Dept: OBGYN CLINIC | Facility: CLINIC | Age: 30
End: 2025-04-21

## 2025-04-21 NOTE — TELEPHONE ENCOUNTER
POSTPARTUM PHONE CALL ASSESSMENT - left message patient's VM & MyChart message sent to patient 2025    Date of Delivery: 2025  Delivering Provider: Dr Darrell Eldridge  Mode:   Delivery Notes/Complications:     Do you still have bleeding/pain? If so, how much/how severe?     Regular BMs/Urination?     Breastfeeding/Formula/Both?     How are you doing emotionally?     Do you have any other questions or concerns for us or your provider?     Have you scheduled the pediatrician appointment with pediatrician?     Do you have a postpartum visit scheduled? Yes 2025 - Dr Eldridge

## 2025-05-01 ENCOUNTER — POSTPARTUM VISIT (OUTPATIENT)
Dept: OBGYN CLINIC | Facility: CLINIC | Age: 30
End: 2025-05-01

## 2025-05-01 VITALS
DIASTOLIC BLOOD PRESSURE: 78 MMHG | BODY MASS INDEX: 30.56 KG/M2 | HEIGHT: 64 IN | WEIGHT: 179 LBS | SYSTOLIC BLOOD PRESSURE: 112 MMHG

## 2025-05-01 PROCEDURE — 99024 POSTOP FOLLOW-UP VISIT: CPT | Performed by: STUDENT IN AN ORGANIZED HEALTH CARE EDUCATION/TRAINING PROGRAM

## 2025-05-01 NOTE — PROGRESS NOTES
"Name: Roshni Wood      : 1995      MRN: [id number]  Encounter Provider: Darrell Eldridge MD  Encounter Date: 2025   Encounter department: [facility] [place]  :  Assessment & Plan  Postpartum care following vaginal delivery  - Normal postpartum exam  - Contraception: condoms  - Depression Screen: Low risk  - Feeding: Breast  - Cervical cancer screening is due. Will plan to complete pap at upcoming annual exam.   - Referred to Physical Therapy for routine postpartum evaluation and treatment.  - Follow up in 3 months for annual exam or as needed.  Orders:  •  Ambulatory Referral to Physical Therapy; Future        History of Present Illness   HPI  Roshni Wood is a 29 y.o. female who presents for routine postpartum follow up. She is 3 weeks s/p spontaneous vaginal delivery on 2025 at 38w5d. Her delivery and postpartum course were notable for gestational hypertension, which did not require antihypertensive therapy. She reports thin lochia. Denies issues with bowel or bladder.   History obtained from: patient    Postpartum Depression: Low Risk  (2025)    [place]  Depression Scale    • Last EPDS Total Score: 0    • Last EPDS Self Harm Result: Never         Review of Systems   All other systems reviewed and are negative.    Medical History Reviewed by provider this encounter:  Tobacco  Med Hx  Surg Hx  Fam Hx  Soc Hx    .     Objective   /78 (BP Location: Right arm, Patient Position: Sitting, Cuff Size: Standard)   Ht 5' 4\" (1.626 m)   Wt 81.2 kg (179 lb)   LMP 2024 (Exact Date)   Breastfeeding Yes   BMI 30.73 kg/m²      Physical Exam  Vitals reviewed. Exam conducted with a chaperone present (Jose Manuel Yang MA).   Constitutional:       Appearance: Normal appearance.   Cardiovascular:      Rate and Rhythm: Normal rate.   Pulmonary:      Effort: Pulmonary effort is normal.   Genitourinary:     Exam position: Lithotomy position. "      Labia:         Right: No rash or tenderness.         Left: No rash or tenderness.       Vagina: Bleeding present. No vaginal discharge, erythema, tenderness, lesions or prolapsed vaginal walls.      Cervix: Normal. No cervical motion tenderness, discharge, friability, lesion, erythema or cervical bleeding.      Uterus: Normal.       Adnexa: Right adnexa normal and left adnexa normal.        Right: No mass, tenderness or fullness.          Left: No mass, tenderness or fullness.        Comments: Thin lochia present  Neurological:      General: No focal deficit present.      Mental Status: She is alert and oriented to person, place, and time.   Psychiatric:         Mood and Affect: Mood normal.         Behavior: Behavior normal.         Thought Content: Thought content normal.         Judgment: Judgment normal.

## 2025-05-08 ENCOUNTER — DOCUMENTATION (OUTPATIENT)
Dept: OBGYN CLINIC | Facility: CLINIC | Age: 30
End: 2025-05-08

## 2025-05-08 NOTE — PROGRESS NOTES
ONAF form submitted for PP visit on 05/08/2025. ONAF form scanned into chart under this encounter.

## 2025-06-02 ENCOUNTER — OFFICE VISIT (OUTPATIENT)
Dept: FAMILY MEDICINE CLINIC | Facility: CLINIC | Age: 30
End: 2025-06-02
Payer: COMMERCIAL

## 2025-06-02 VITALS
WEIGHT: 170.4 LBS | TEMPERATURE: 98.5 F | DIASTOLIC BLOOD PRESSURE: 76 MMHG | OXYGEN SATURATION: 97 % | HEART RATE: 76 BPM | RESPIRATION RATE: 16 BRPM | HEIGHT: 64 IN | SYSTOLIC BLOOD PRESSURE: 116 MMHG | BODY MASS INDEX: 29.09 KG/M2

## 2025-06-02 DIAGNOSIS — Z13.6 SCREENING FOR CARDIOVASCULAR CONDITION: ICD-10-CM

## 2025-06-02 DIAGNOSIS — Z00.00 ANNUAL PHYSICAL EXAM: Primary | ICD-10-CM

## 2025-06-02 DIAGNOSIS — Z13.1 SCREENING FOR DIABETES MELLITUS: ICD-10-CM

## 2025-06-02 PROCEDURE — 99395 PREV VISIT EST AGE 18-39: CPT | Performed by: FAMILY MEDICINE

## 2025-06-02 RX ORDER — SERTRALINE HYDROCHLORIDE 25 MG/1
25 TABLET, FILM COATED ORAL DAILY
Qty: 30 TABLET | Refills: 5 | Status: SHIPPED | OUTPATIENT
Start: 2025-06-02 | End: 2025-11-29

## 2025-06-02 NOTE — PATIENT INSTRUCTIONS
"Patient Education     Routine physical for adults   The Basics   Written by the doctors and editors at [facility]   What is a physical? -- A physical is a routine visit, or \"check-up,\" with your doctor. You might also hear it called a \"wellness visit\" or \"preventive visit.\"  During each visit, the doctor will:   Ask about your physical and mental health   Ask about your habits, behaviors, and lifestyle   Do an exam   Give you vaccines if needed   Talk to you about any medicines you take   Give advice about your health   Answer your questions  Getting regular check-ups is an important part of taking care of your health. It can help your doctor find and treat any problems you have. But it's also important for preventing health problems.  A routine physical is different from a \"sick visit.\" A sick visit is when you see a doctor because of a health concern or problem. Since physicals are scheduled ahead of time, you can think about what you want to ask the doctor.  How often should I get a physical? -- It depends on your age and health. In general, for people age 21 years and older:   If you are younger than 50 years, you might be able to get a physical every 3 years.   If you are 50 years or older, your doctor might recommend a physical every year.  If you have an ongoing health condition, like diabetes or high blood pressure, your doctor will probably want to see you more often.  What happens during a physical? -- In general, each visit will include:   Physical exam - The doctor or nurse will check your height, weight, heart rate, and blood pressure. They will also look at your eyes and ears. They will ask about how you are feeling and whether you have any symptoms that bother you.   Medicines - It's a good idea to bring a list of all the medicines you take to each doctor visit. Your doctor will talk to you about your medicines and answer any questions. Tell them if you are having any side effects that bother you. You " "should also tell them if you are having trouble paying for any of your medicines.   Habits and behaviors - This includes:   Your diet   Your exercise habits   Whether you smoke, drink alcohol, or use drugs   Whether you are sexually active   Whether you feel safe at home  Your doctor will talk to you about things you can do to improve your health and lower your risk of health problems. They will also offer help and support. For example, if you want to quit smoking, they can give you advice and might prescribe medicines. If you want to improve your diet or get more physical activity, they can help you with this, too.   Lab tests, if needed - The tests you get will depend on your age and situation. For example, your doctor might want to check your:   Cholesterol   Blood sugar   Iron level   Vaccines - The recommended vaccines will depend on your age, health, and what vaccines you already had. Vaccines are very important because they can prevent certain serious or deadly infections.   Discussion of screening - \"Screening\" means checking for diseases or other health problems before they cause symptoms. Your doctor can recommend screening based on your age, risk, and preferences. This might include tests to check for:   Cancer, such as breast, prostate, cervical, ovarian, colorectal, prostate, lung, or skin cancer   Sexually transmitted infections, such as chlamydia and gonorrhea   Mental health conditions like depression and anxiety  Your doctor will talk to you about the different types of screening tests. They can help you decide which screenings to have. They can also explain what the results might mean.   Answering questions - The physical is a good time to ask the doctor or nurse questions about your health. If needed, they can refer you to other doctors or specialists, too.  Adults older than 65 years often need other care, too. As you get older, your doctor will talk to you about:   How to prevent falling at " home   Hearing or vision tests   Memory testing   How to take your medicines safely   Making sure that you have the help and support you need at home  All topics are updated as new evidence becomes available and our peer review process is complete.  This topic retrieved from [employer] on: May 02, 2024.  Topic 805624 Version 1.0  Release: 32.4.3 - C32.122  © 2024 UpToDate, Inc. and/or its affiliates. All rights reserved.  Consumer Information Use and Disclaimer   Disclaimer: This generalized information is a limited summary of diagnosis, treatment, and/or medication information. It is not meant to be comprehensive and should be used as a tool to help the user understand and/or assess potential diagnostic and treatment options. It does NOT include all information about conditions, treatments, medications, side effects, or risks that may apply to a specific patient. It is not intended to be medical advice or a substitute for the medical advice, diagnosis, or treatment of a health care provider based on the health care provider's examination and assessment of a patient's specific and unique circumstances. Patients must speak with a health care provider for complete information about their health, medical questions, and treatment options, including any risks or benefits regarding use of medications. This information does not endorse any treatments or medications as safe, effective, or approved for treating a specific patient. UpToDate, Inc. and its affiliates disclaim any warranty or liability relating to this information or the use thereof.The use of this information is governed by the Terms of Use, available at https://www.[employer].com/en/know/clinical-effectiveness-terms. 2024© UpToDate, Inc. and its affiliates and/or licensors. All rights reserved.  Copyright   © 2024 UpToDate, Inc. and/or its affiliates. All rights reserved.

## 2025-06-02 NOTE — PROGRESS NOTES
Adult Annual Physical  Name: Roshni Wood      : 1995      MRN: [id number]  Encounter Provider: Ya Dumont DO  Encounter Date: 2025   Encounter department: [facility]    :  Assessment & Plan  Annual physical exam    Orders:    Comprehensive metabolic panel; Future    CBC and differential; Future    TSH, 3rd generation with Free T4 reflex; Future    Lipid panel; Future    Screening for diabetes mellitus    Orders:    Comprehensive metabolic panel; Future    Screening for cardiovascular condition    Orders:    Lipid panel; Future    Post partum depression  Will start zoloft 25 mg daily  Follow up in 6 weeks for med check or sooner if needed     Orders:    sertraline (ZOLOFT) 25 mg tablet; Take 1 tablet (25 mg total) by mouth daily    Comprehensive metabolic panel; Future    CBC and differential; Future    TSH, 3rd generation with Free T4 reflex; Future        Preventive Screenings:  - Diabetes Screening: screening up-to-date  - Hepatitis C screening: screening up-to-date   - HIV screening: screening up-to-date   - Lung cancer screening: screening not indicated          History of Present Illness     Adult Annual Physical:  Patient presents for annual physical.     Diet and Physical Activity:  - Diet/Nutrition: well balanced diet.  - Exercise: walking.    Depression Screening:  - PHQ-2 Score: 2    Review of Systems   Constitutional:  Negative for chills, fatigue and fever.   HENT:  Negative for congestion, postnasal drip, rhinorrhea and sinus pressure.    Eyes:  Negative for photophobia and visual disturbance.   Respiratory:  Negative for cough and shortness of breath.    Cardiovascular:  Negative for chest pain, palpitations and leg swelling.   Gastrointestinal:  Negative for abdominal pain, constipation, diarrhea, nausea and vomiting.   Genitourinary:  Negative for difficulty urinating and dysuria.   Musculoskeletal:  Negative for arthralgias and myalgias.  "  Skin:  Negative for color change and rash.   Neurological:  Negative for dizziness, weakness, light-headedness and headaches.     Pertinent Medical History         Medical History Reviewed by provider this encounter:  Tobacco  Allergies  Meds  Problems  Med Hx  Surg Hx  Fam Hx     .  Past Medical History   Past Medical History[1]  Past Surgical History[2]  Family History[3]   reports that she has never smoked. She has never been exposed to tobacco smoke. She has never used smokeless tobacco. She reports that she does not currently use alcohol. She reports that she does not use drugs.  Current Outpatient Medications   Medication Instructions    acetaminophen (TYLENOL) 650 mg, Oral, Every 4 hours PRN    cholecalciferol (VITAMIN D3) 1,000 Units, Daily    ferrous gluconate (FERGON) 324 mg, Daily with breakfast    Prenatal Vit-Fe Fumarate-FA (PRENATAL VITAMIN PO) 1 capsule, Daily    sertraline (ZOLOFT) 25 mg, Oral, Daily   Allergies[4]   Medications Ordered Prior to Encounter[5]   Social History[6]    Objective   /76 (BP Location: Left arm, Patient Position: Sitting, Cuff Size: Standard)   Pulse 76   Temp 98.5 °F (36.9 °C) (Tympanic)   Resp 16   Ht 5' 4\" (1.626 m)   Wt 77.3 kg (170 lb 6.4 oz)   LMP 07/13/2024 (Exact Date)   SpO2 97%   Breastfeeding Yes   BMI 29.25 kg/m²     Physical Exam  Constitutional:       General: She is not in acute distress.     Appearance: Normal appearance. She is not ill-appearing, toxic-appearing or diaphoretic.   HENT:      Head: Normocephalic and atraumatic.      Right Ear: Tympanic membrane and ear canal normal.      Left Ear: Tympanic membrane and ear canal normal.      Nose: Nose normal. No congestion.      Mouth/Throat:      Mouth: Mucous membranes are moist.      Pharynx: Oropharynx is clear. No oropharyngeal exudate.     Eyes:      Extraocular Movements: Extraocular movements intact.      Conjunctiva/sclera: Conjunctivae normal.       Cardiovascular:      Rate " and Rhythm: Normal rate and regular rhythm.      Pulses: Normal pulses.      Heart sounds: No murmur heard.  Pulmonary:      Effort: Pulmonary effort is normal.      Breath sounds: Normal breath sounds. No wheezing, rhonchi or rales.   Abdominal:      General: Bowel sounds are normal. There is no distension.      Palpations: Abdomen is soft.     Musculoskeletal:         General: No swelling or tenderness. Normal range of motion.      Cervical back: Normal range of motion and neck supple.     Skin:     General: Skin is warm and dry.      Capillary Refill: Capillary refill takes less than 2 seconds.     Neurological:      General: No focal deficit present.      Mental Status: She is alert and oriented to person, place, and time.      Cranial Nerves: No cranial nerve deficit.     Psychiatric:         Mood and Affect: Mood normal.         Behavior: Behavior normal.         Thought Content: Thought content normal.              [1] No past medical history on file.  [2]   Past Surgical History:  Procedure Laterality Date    WISDOM TOOTH EXTRACTION     [3]   Family History  Problem Relation Name Age of Onset    No Known Problems Mother      Pancreatic cancer Father  63    Breast cancer Maternal Grandmother          unknown    Heart attack Maternal Grandfather      No Known Problems Paternal Grandmother      No Known Problems Paternal Grandfather      Breast cancer Maternal Aunt          unknown age    Brain cancer Maternal Aunt      Breast cancer Paternal Aunt          age unknown    Pancreatic cancer Paternal Uncle     [4] No Known Allergies  [5]   Current Outpatient Medications on File Prior to Visit   Medication Sig Dispense Refill    cholecalciferol (VITAMIN D3) 1,000 units tablet Take 1,000 Units by mouth in the morning.      ferrous gluconate (FERGON) 324 mg tablet Take 324 mg by mouth daily with breakfast      Prenatal Vit-Fe Fumarate-FA (PRENATAL VITAMIN PO) Take 1 capsule by mouth in the morning.      acetaminophen  (TYLENOL) 325 mg tablet Take 2 tablets (650 mg total) by mouth every 4 (four) hours as needed for mild pain (Patient not taking: Reported on 5/1/2025)      [DISCONTINUED] ibuprofen (MOTRIN) 600 mg tablet Take 1 tablet (600 mg total) by mouth every 6 (six) hours as needed for mild pain (Patient not taking: Reported on 5/1/2025) 30 tablet 0     No current facility-administered medications on file prior to visit.   [6]   Social History  Tobacco Use    Smoking status: Never     Passive exposure: Never    Smokeless tobacco: Never   Vaping Use    Vaping status: Never Used   Substance and Sexual Activity    Alcohol use: Not Currently    Drug use: Never    Sexual activity: Yes     Partners: Male     Birth control/protection: None

## 2025-06-02 NOTE — ASSESSMENT & PLAN NOTE
Will start zoloft 25 mg daily  Follow up in 6 weeks for med check or sooner if needed     Orders:    sertraline (ZOLOFT) 25 mg tablet; Take 1 tablet (25 mg total) by mouth daily    Comprehensive metabolic panel; Future    CBC and differential; Future    TSH, 3rd generation with Free T4 reflex; Future

## 2025-06-03 ENCOUNTER — EVALUATION (OUTPATIENT)
Dept: PHYSICAL THERAPY | Facility: CLINIC | Age: 30
End: 2025-06-03
Attending: STUDENT IN AN ORGANIZED HEALTH CARE EDUCATION/TRAINING PROGRAM
Payer: COMMERCIAL

## 2025-06-03 DIAGNOSIS — M62.89 PELVIC FLOOR DYSFUNCTION IN FEMALE: Primary | ICD-10-CM

## 2025-06-03 PROCEDURE — 97112 NEUROMUSCULAR REEDUCATION: CPT | Performed by: PHYSICAL THERAPIST

## 2025-06-03 PROCEDURE — 97161 PT EVAL LOW COMPLEX 20 MIN: CPT | Performed by: PHYSICAL THERAPIST

## 2025-06-03 NOTE — PROGRESS NOTES
PT Evaluation     Today's date: 6/3/2025  Patient name: Roshni Wood  : 1995  MRN: [id number]  Referring provider: Darrell Eldridge MD  Dx:   Encounter Diagnosis     ICD-10-CM    1. Pelvic floor dysfunction in female  M62.89       2. Postpartum care following vaginal delivery  Z39.2                      Assessment  Impairments: abnormal muscle tone, abnormal or restricted ROM, impaired physical strength, lacks appropriate home exercise program and poor posture     Assessment details: Roshni Wood is a 29 y.o. female  who presents s/p vaginal delivery on 25 with concerns of overall core deconditioning. Pelvic floor anatomy was explained to patient utilizing a pelvic model and examination technique was discussed, including all risks and precautions. Patient provided verbal and written consent for internal pelvic floor muscle assessment prior to examination.  Demonstrates decrease in TA and PFM strength/endurance and demonstrated bearing down with attempted core activation. Neuromuscular re-education exercises include instruction and cues on appropriate contraction pelvic floor muscles (PFM) and transverse abdominal muscle (TA) without accessory muscles and relaxation of PFM through diaphragm breathing and stretches. Patient presents with the below outlined deficits and is appropriate for skilled physical therapy in order to address deficits and ultimately meet goal of independent self management of condition. Thank you for this referral!    Goals  Impairment Goals upon discharge  - Improve MMT for pelvic floor muscle (PFM) to greater than or equal to 4/5 in order to improve lumbopelvic stability  - Increase b/l LE strength to 4+/5 to improve lumbopelvic stability  - Improve relaxation of PFM to 100% in 2 seconds  - Demonstrates appropriate breathing mechanics with pelvic floor relaxation and contraction for improved muscle coordination in 8 weeks    Functional Goals Upon  "Discharge  - Pt will be independent with home exercise program in order to improve functional abilities and quality of life  - Patient reports no tension/friction with vaginal intercourse for improved quality of life.   - Patient demonstrates proper body mechanics with lifting using TA and pelvic floor activation    Plan  Patient would benefit from: women's health eval and skilled physical therapy    Planned therapy interventions: manual therapy, neuromuscular re-education, patient education, self care, strengthening, stretching, home exercise program, behavior modification, therapeutic activities, therapeutic exercise and joint mobilization    Frequency: 1x week  Duration in weeks: 10  Plan of Care expiration date: 8/12/2025  Treatment plan discussed with: patient        SL MARIA DEL CARMEN SF PT WOMEN'S HEALTH POSTPARTUM SUBJECTIVE EVAL:   History Of Present Illness:  30 yo female presents s/p spontaneous vaginal delivery of 4th child on 4/9/2025 at 38w5d. Pt denies back pain and reports no concern of urinary leakage. Patient desires to gain greater understand on how to strengthening her core to avoid future back or pelvic problems.        Pt reports history of tearing with prior 3 vaginal delivery  Baby feeding:  Breast only  Patient plans to return to work: No ([employer])    Other children: Yes     3 other children  Exercise/Fitness current activity and goals:  Light walking  Birth control:  Condoms   tightness, friction  Daily urination/voiding frequency (times per day):  5-8  Daily hydration:  60-80 ounces  Bowel frequency/regularity:  Daily  [place] type:  4      Objective       Abdominal Assessment:      Position: supine exam    Diastatis   3\" above umbilicus (# fingers): 0  Umbilicus (# fingers): 1  3\" below umbilicus (# fingers): 0  Connective tissue integrity at linea alba: firm  tenderness at linea alba  Unable to engage transverse abdominis: with PFM co-contraction only.        Pelvic Floor Muscle Exam:     Muscle " "Contraction: well isolated    100% pelvic floor relaxation        PERFECT Score        Perfect Score: Right PFM: Anterior: 3/5, 5 sec hold; Posterior: 3/5, 5 sec hold  Left PFM: Anterior: 2/5, 5 sec hold; Posterior: 3/5, 5 sec hold     Bearing down noted with attempt to engage core.  TA fair (-) co-contraction noted with PFM contraction    Palpation: mild tension in transfers perineal muscle b/l  Denied ttp along posterior PFM      pelvic floor exam consent given by patient    Pelvic exam completed: vaginally              Precautions: none  Goal: PFM/TA strength with lifting and ADLs    Manuals 6/3            External TPR to PFM in s/l                                                    Neuro Re-Ed             Seated Kegel>TA 2\"x5  HEP            S/l hip abd             S/l clamshell             Quadruped TA                                                    Ther Ex             Core/LE assessment nv            Seated Happy Baby stretch 30\"x1                                                                                          Ther Activity             Sit to stand with exhalation nv            Body mechanics with lifting with exhalation nv                         Gait Training                                       Modalities                                              "

## 2025-06-09 NOTE — PROGRESS NOTES
"Daily Note     Today's date: 6/10/2025  Patient name: Roshni Wood  : 1995  MRN: [id number]  Referring provider: Darrell Eldridge MD  Dx:   Encounter Diagnosis     ICD-10-CM    1. Pelvic floor dysfunction in female  M62.89       2. Postpartum care following vaginal delivery  Z39.2           Start Time: 175  Stop Time:   Total time in clinic (min): 41 minutes    Subjective: Pt reports that she has been doing the exercises. \"I have not had any pain. I just get tired.\"      Objective: See treatment diary below      Assessment: The pt participated in a skilled physical therapy session that focused on neuromuscular re-education and therapeutic exercise. The pt presented with difficulty relaxing her body and focusing her attention. She was educated on the importance of breathing to help with relaxation, focus, as well as the use of diaphragmatic breathing for Kegel's. She tolerated treatment well. The pt would benefit from continued PT to address impairments in order to improve her quality of life and pelvic floor function.      Plan: Continue per plan of care.  Continue POC as per primary PT.     Precautions: none  Goal: PFM/TA strength with lifting and ADLs    Manuals 6/3 6/10           External TPR to PFM in s/l                                                    Neuro Re-Ed             Seated Kegel>TA 2\"x5  HEP 2\"x10           Diaphragmatic Breathing  10x + 5x           Seated: TA Activation  5\"x10           S/l hip abd             S/l clamshell             Quadruped TA                                                    Ther Ex             Core/LE assessment nv            Seated Happy Baby stretch 30\"x1 reviewed                                                               Pt edu: Self care  KS           HEP  KS           Anatomy as it relates to pt's symptoms/presentation  KS           Address pt questions PRN  KS           Update on pt's symptoms/status  KS                        Ther Activity "             Sit to stand with exhalation nv            Body mechanics with lifting with exhalation nv                         Gait Training                                       Modalities

## 2025-06-10 ENCOUNTER — OFFICE VISIT (OUTPATIENT)
Dept: PHYSICAL THERAPY | Facility: CLINIC | Age: 30
End: 2025-06-10
Attending: STUDENT IN AN ORGANIZED HEALTH CARE EDUCATION/TRAINING PROGRAM
Payer: COMMERCIAL

## 2025-06-10 DIAGNOSIS — M62.89 PELVIC FLOOR DYSFUNCTION IN FEMALE: Primary | ICD-10-CM

## 2025-06-10 PROCEDURE — 97112 NEUROMUSCULAR REEDUCATION: CPT

## 2025-06-10 PROCEDURE — 97110 THERAPEUTIC EXERCISES: CPT

## 2025-06-17 ENCOUNTER — OFFICE VISIT (OUTPATIENT)
Dept: PHYSICAL THERAPY | Facility: CLINIC | Age: 30
End: 2025-06-17
Attending: STUDENT IN AN ORGANIZED HEALTH CARE EDUCATION/TRAINING PROGRAM
Payer: COMMERCIAL

## 2025-06-17 DIAGNOSIS — M62.89 PELVIC FLOOR DYSFUNCTION IN FEMALE: Primary | ICD-10-CM

## 2025-06-17 PROCEDURE — 97110 THERAPEUTIC EXERCISES: CPT | Performed by: PHYSICAL THERAPIST

## 2025-06-17 PROCEDURE — 97112 NEUROMUSCULAR REEDUCATION: CPT | Performed by: PHYSICAL THERAPIST

## 2025-06-17 NOTE — PROGRESS NOTES
"Daily Note     Today's date: 2025  Patient name: Roshni Wood  : 1995  MRN: [id number]  Referring provider: Darrell Eldridge MD  Dx:   Encounter Diagnosis     ICD-10-CM    1. Pelvic floor dysfunction in female  M62.89       2. Postpartum care following vaginal delivery  Z39.2                      Subjective: Pt reports finding a better rhythm with performing HEP.      Objective: See treatment diary below  Hip flexion: 4/5 R, 4-/5 L (mild pelvic instability)  Hip Ex: 4+/5 R, 4/5 L (moderate pelvic instability)  Hip Abd: 4+/5 R, 5/5 L  Hip Add: 4+/5 b/l  Hip ER: 4/5 R, 4+/5 L  Hip IR: 4/5 R, 4+/5 L      Assessment: Pt noted minimal PFM co-contraction with seated TA. Added seated kegel+TA with increase TA activation/control noted. Assessed hip strength with overall strength noted, but pelvic instability due to core weakness noted. Pt noted incomplete PFM relaxation after 9 reps during 1st set of supine kegel>TA, but noted full PFM relaxation after added supine DKC and supine happy baby stretch. Patient would benefit from continued PT      Plan: Continue per plan of care.      Precautions: none  Goal: PFM/TA strength with lifting and ADLs    Manuals 6/3 6/10 6/17          External TPR to PFM in s/l                                                    Neuro Re-Ed             Supine Kegel>TA   5\"x9  5\"x10          Seated Kegel>TA 2\"x5  HEP 2\"x10 5\"x10          Diaphragmatic Breathing  10x + 5x           S/l hip abd             S/l clamshell             Quadruped TA   Attempted          Modified plank   Over use of rectus abd                                    Ther Ex             Core/LE assessment nv  performed          Seated Happy Baby stretch 30\"x1 reviewed 30\"x1          Supine DKC   30\"x1          Supine Happy Baby stretch   30\"x1                                    Pt edu: Self care  KS           HEP  KS ABEL          Anatomy as it relates to pt's symptoms/presentation  KS           Address pt " questions PRN  KS           Update on pt's symptoms/status  KS                        Ther Activity             Sit to stand with exhalation nv            Body mechanics with lifting with exhalation nv                         Gait Training                                       Modalities

## 2025-06-24 ENCOUNTER — APPOINTMENT (OUTPATIENT)
Dept: PHYSICAL THERAPY | Facility: CLINIC | Age: 30
End: 2025-06-24
Attending: STUDENT IN AN ORGANIZED HEALTH CARE EDUCATION/TRAINING PROGRAM
Payer: COMMERCIAL

## 2025-06-24 DIAGNOSIS — M62.89 PELVIC FLOOR DYSFUNCTION IN FEMALE: Primary | ICD-10-CM

## 2025-06-24 NOTE — PROGRESS NOTES
"Daily Note     Today's date: 2025  Patient name: Roshni Wood  : 1995  MRN: [id number]  Referring provider: Darrell Eldridge MD  Dx:   Encounter Diagnosis     ICD-10-CM    1. Pelvic floor dysfunction in female  M62.89       2. Postpartum care following vaginal delivery  Z39.2                      Subjective: ***      Objective: See treatment diary below      Assessment: Tolerated treatment {Tolerated treatment :7943543026}. Patient {assessment:1428081635}      Plan: {PLAN:4866426465}     Precautions: none  Goal: PFM/TA strength with lifting and ADLs    Manuals 6/3 6/10 6/17          External TPR to PFM in s/l                                                    Neuro Re-Ed             Supine Kegel>TA   5\"x9  5\"x10          Seated Kegel>TA 2\"x5  HEP 2\"x10 5\"x10          Diaphragmatic Breathing  10x + 5x           S/l hip abd             S/l clamshell             Quadruped TA   Attempted          Modified plank   Over use of rectus abd                                    Ther Ex             Core/LE assessment nv  performed          Seated Happy Baby stretch 30\"x1 reviewed 30\"x1          Supine DKC   30\"x1          Supine Happy Baby stretch   30\"x1                                    Pt edu: Self care  KS           HEP  KS ABEL          Anatomy as it relates to pt's symptoms/presentation  KS           Address pt questions PRN  KS           Update on pt's symptoms/status  KS                        Ther Activity             Sit to stand with exhalation nv            Body mechanics with lifting with exhalation nv                         Gait Training                                       Modalities                                                       "

## 2025-07-18 ENCOUNTER — TELEMEDICINE (OUTPATIENT)
Dept: FAMILY MEDICINE CLINIC | Facility: CLINIC | Age: 30
End: 2025-07-18
Payer: COMMERCIAL

## 2025-07-18 VITALS — WEIGHT: 165 LBS | BODY MASS INDEX: 28.17 KG/M2 | HEIGHT: 64 IN

## 2025-07-18 PROCEDURE — 98005 SYNCH AUDIO-VIDEO EST LOW 20: CPT | Performed by: FAMILY MEDICINE

## 2025-07-18 NOTE — ASSESSMENT & PLAN NOTE
Roshni is doing well on zoloft 25 mg daily  Continue as prescribed  If she feels increase of symptoms she will increase dose to 50 mg and contact our office.  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

## 2025-07-18 NOTE — PROGRESS NOTES
"Virtual Regular Visit  Name: Roshni Wood      : 1995      MRN: [id number]  Encounter Provider: Ya Dumont DO  Encounter Date: 2025   Encounter department: [facility]  :  Assessment & Plan  Post partum depression  Roshni is doing well on zoloft 25 mg daily  Continue as prescribed  If she feels increase of symptoms she will increase dose to 50 mg and contact our office.  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              History of Present Illness     HPI  Review of Systems   Constitutional:  Negative for chills, fatigue and fever.   HENT:  Negative for congestion, postnasal drip, rhinorrhea and sinus pressure.    Eyes:  Negative for photophobia and visual disturbance.   Respiratory:  Negative for cough and shortness of breath.    Cardiovascular:  Negative for chest pain, palpitations and leg swelling.   Gastrointestinal:  Negative for abdominal pain, constipation, diarrhea, nausea and vomiting.   Genitourinary:  Negative for difficulty urinating and dysuria.   Musculoskeletal:  Negative for arthralgias and myalgias.   Skin:  Negative for color change and rash.   Neurological:  Negative for dizziness, weakness, light-headedness and headaches.       Objective   Ht 5' 4\" (1.626 m)   Wt 74.8 kg (165 lb)   BMI 28.32 kg/m²     Physical Exam  Constitutional:       General: She is not in acute distress.     Appearance: She is not ill-appearing, toxic-appearing or diaphoretic.   HENT:      Head: Normocephalic and atraumatic.      Nose: Nose normal.     Eyes:      Extraocular Movements: Extraocular movements intact.      Conjunctiva/sclera: Conjunctivae normal.     Pulmonary:      Effort: Pulmonary effort is normal. No respiratory distress.     Musculoskeletal:      Cervical back: Normal range of motion.     Neurological:      General: No focal deficit present.      Mental Status: " She is alert and oriented to person, place, and time.     Psychiatric:         Mood and Affect: Mood normal.         Behavior: Behavior normal.         Thought Content: Thought content normal.         Judgment: Judgment normal.         Administrative Statements   Encounter provider Ya Dumont, DO    The Patient is located at Home and in the following state in which I hold an active license [place].    The patient was identified by name and date of birth. Roshni Wood was informed that this is a telemedicine visit and that the visit is being conducted through the Epic Embedded platform. She agrees to proceed..  My office door was closed. No one else was in the room.  She acknowledged consent and understanding of privacy and security of the video platform. The patient has agreed to participate and understands they can discontinue the visit at any time.    I have spent a total time of 20 minutes in caring for this patient on the day of the visit/encounter including Instructions for management, Patient and family education, and Importance of tx compliance, not including the time spent for establishing the audio/video connection.